# Patient Record
Sex: MALE | Race: WHITE
[De-identification: names, ages, dates, MRNs, and addresses within clinical notes are randomized per-mention and may not be internally consistent; named-entity substitution may affect disease eponyms.]

---

## 2017-06-10 ENCOUNTER — HOSPITAL ENCOUNTER (INPATIENT)
Dept: HOSPITAL 25 - ED | Age: 82
LOS: 11 days | Discharge: NURSING, REHAB OR LONG-TERM CARE | DRG: 378 | End: 2017-06-21
Attending: INTERNAL MEDICINE | Admitting: HOSPITALIST
Payer: MEDICARE

## 2017-06-10 DIAGNOSIS — I10: ICD-10-CM

## 2017-06-10 DIAGNOSIS — R09.02: ICD-10-CM

## 2017-06-10 DIAGNOSIS — Z80.8: ICD-10-CM

## 2017-06-10 DIAGNOSIS — R40.2412: ICD-10-CM

## 2017-06-10 DIAGNOSIS — Z87.891: ICD-10-CM

## 2017-06-10 DIAGNOSIS — Z80.42: ICD-10-CM

## 2017-06-10 DIAGNOSIS — M19.90: ICD-10-CM

## 2017-06-10 DIAGNOSIS — Z88.0: ICD-10-CM

## 2017-06-10 DIAGNOSIS — R55: ICD-10-CM

## 2017-06-10 DIAGNOSIS — Z79.01: ICD-10-CM

## 2017-06-10 DIAGNOSIS — E87.0: ICD-10-CM

## 2017-06-10 DIAGNOSIS — Z80.52: ICD-10-CM

## 2017-06-10 DIAGNOSIS — K92.1: ICD-10-CM

## 2017-06-10 DIAGNOSIS — K26.4: Primary | ICD-10-CM

## 2017-06-10 DIAGNOSIS — Z80.1: ICD-10-CM

## 2017-06-10 DIAGNOSIS — F10.231: ICD-10-CM

## 2017-06-10 DIAGNOSIS — T39.395A: ICD-10-CM

## 2017-06-10 DIAGNOSIS — R07.89: ICD-10-CM

## 2017-06-10 DIAGNOSIS — Z82.49: ICD-10-CM

## 2017-06-10 DIAGNOSIS — R50.9: ICD-10-CM

## 2017-06-10 DIAGNOSIS — J44.9: ICD-10-CM

## 2017-06-10 DIAGNOSIS — D62: ICD-10-CM
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
## 2017-06-10 DIAGNOSIS — R63.4: ICD-10-CM

## 2017-06-10 DIAGNOSIS — Z82.61: ICD-10-CM

## 2017-06-10 LAB
ALBUMIN SERPL BCG-MCNC: 2.9 G/DL (ref 3.2–5.2)
ALP SERPL-CCNC: 44 U/L (ref 34–104)
ALT SERPL W P-5'-P-CCNC: 9 U/L (ref 7–52)
ANION GAP SERPL CALC-SCNC: 5 MMOL/L (ref 2–11)
AST SERPL-CCNC: 12 U/L (ref 13–39)
BUN SERPL-MCNC: 61 MG/DL (ref 6–24)
BUN/CREAT SERPL: 47.3 (ref 8–20)
CALCIUM SERPL-MCNC: 8 MG/DL (ref 8.6–10.3)
CHLORIDE SERPL-SCNC: 108 MMOL/L (ref 101–111)
FERRITIN SERPL IA-MCNC: 24.6 NG/ML (ref 24–336)
FOLATE SERPL-MCNC: > 20 NG/ML (ref 3.99–?)
GLOBULIN SER CALC-MCNC: 2 G/DL (ref 2–4)
GLUCOSE SERPL-MCNC: 119 MG/DL (ref 70–100)
HCO3 SERPL-SCNC: 22 MMOL/L (ref 22–32)
HCT VFR BLD AUTO: 22 % (ref 42–52)
HGB BLD-MCNC: 7 G/DL (ref 14–18)
IRON SERPL-MCNC: 47 UG/DL (ref 50–212)
MATURATION FACTOR RETIC: 2
MCH RBC QN AUTO: 31 PG (ref 27–31)
MCHC RBC AUTO-ENTMCNC: 33 G/DL (ref 31–36)
MCV RBC AUTO: 95 FL (ref 80–94)
POTASSIUM SERPL-SCNC: 4.9 MMOL/L (ref 3.5–5)
PREALB SERPL-MCNC: 25 MG/DL (ref 18–38)
PROT SERPL-MCNC: 4.9 G/DL (ref 6.4–8.9)
RBC # BLD AUTO: 2.26 10^6/UL (ref 4–5.4)
RETICULOCYTE PRODUCTION INDEX: 0.9 % (ref 0.5–1.5)
SODIUM SERPL-SCNC: 135 MMOL/L (ref 133–145)
TIBC SERPL-MCNC: 279 MCG/DL (ref 250–450)
TRANSFERRIN SERPL-MCNC: 199 MG/DL (ref 203–362)
TROPONIN I SERPL-MCNC: 0 NG/ML (ref ?–0.04)
VIT B12 SERPL-MCNC: 214 PG/ML (ref 180–914)
WBC # BLD AUTO: 9.1 10^3/UL (ref 3.5–10.8)

## 2017-06-10 PROCEDURE — 83010 ASSAY OF HAPTOGLOBIN QUANT: CPT

## 2017-06-10 PROCEDURE — 85027 COMPLETE CBC AUTOMATED: CPT

## 2017-06-10 PROCEDURE — 83615 LACTATE (LD) (LDH) ENZYME: CPT

## 2017-06-10 PROCEDURE — 87077 CULTURE AEROBIC IDENTIFY: CPT

## 2017-06-10 PROCEDURE — 80048 BASIC METABOLIC PNL TOTAL CA: CPT

## 2017-06-10 PROCEDURE — 94640 AIRWAY INHALATION TREATMENT: CPT

## 2017-06-10 PROCEDURE — 93306 TTE W/DOPPLER COMPLETE: CPT

## 2017-06-10 PROCEDURE — 86900 BLOOD TYPING SEROLOGIC ABO: CPT

## 2017-06-10 PROCEDURE — 85018 HEMOGLOBIN: CPT

## 2017-06-10 PROCEDURE — 36415 COLL VENOUS BLD VENIPUNCTURE: CPT

## 2017-06-10 PROCEDURE — 87641 MR-STAPH DNA AMP PROBE: CPT

## 2017-06-10 PROCEDURE — C1751 CATH, INF, PER/CENT/MIDLINE: HCPCS

## 2017-06-10 PROCEDURE — 83550 IRON BINDING TEST: CPT

## 2017-06-10 PROCEDURE — 84484 ASSAY OF TROPONIN QUANT: CPT

## 2017-06-10 PROCEDURE — 85045 AUTOMATED RETICULOCYTE COUNT: CPT

## 2017-06-10 PROCEDURE — 83540 ASSAY OF IRON: CPT

## 2017-06-10 PROCEDURE — 82728 ASSAY OF FERRITIN: CPT

## 2017-06-10 PROCEDURE — 83735 ASSAY OF MAGNESIUM: CPT

## 2017-06-10 PROCEDURE — 87040 BLOOD CULTURE FOR BACTERIA: CPT

## 2017-06-10 PROCEDURE — 85610 PROTHROMBIN TIME: CPT

## 2017-06-10 PROCEDURE — 82607 VITAMIN B-12: CPT

## 2017-06-10 PROCEDURE — 93005 ELECTROCARDIOGRAM TRACING: CPT

## 2017-06-10 PROCEDURE — 85025 COMPLETE CBC W/AUTO DIFF WBC: CPT

## 2017-06-10 PROCEDURE — 74176 CT ABD & PELVIS W/O CONTRAST: CPT

## 2017-06-10 PROCEDURE — 70450 CT HEAD/BRAIN W/O DYE: CPT

## 2017-06-10 PROCEDURE — 85014 HEMATOCRIT: CPT

## 2017-06-10 PROCEDURE — 82270 OCCULT BLOOD FECES: CPT

## 2017-06-10 PROCEDURE — 80053 COMPREHEN METABOLIC PANEL: CPT

## 2017-06-10 PROCEDURE — 85730 THROMBOPLASTIN TIME PARTIAL: CPT

## 2017-06-10 PROCEDURE — P9040 RBC LEUKOREDUCED IRRADIATED: HCPCS

## 2017-06-10 PROCEDURE — 94660 CPAP INITIATION&MGMT: CPT

## 2017-06-10 PROCEDURE — 82140 ASSAY OF AMMONIA: CPT

## 2017-06-10 PROCEDURE — G0480 DRUG TEST DEF 1-7 CLASSES: HCPCS

## 2017-06-10 PROCEDURE — 86901 BLOOD TYPING SEROLOGIC RH(D): CPT

## 2017-06-10 PROCEDURE — 80320 DRUG SCREEN QUANTALCOHOLS: CPT

## 2017-06-10 PROCEDURE — 71010: CPT

## 2017-06-10 PROCEDURE — 82746 ASSAY OF FOLIC ACID SERUM: CPT

## 2017-06-10 PROCEDURE — 94760 N-INVAS EAR/PLS OXIMETRY 1: CPT

## 2017-06-10 PROCEDURE — 84134 ASSAY OF PREALBUMIN: CPT

## 2017-06-10 PROCEDURE — 86850 RBC ANTIBODY SCREEN: CPT

## 2017-06-10 PROCEDURE — 86922 COMPATIBILITY TEST ANTIGLOB: CPT

## 2017-06-10 RX ADMIN — PANTOPRAZOLE SODIUM SCH MLS/HR: 40 INJECTION, POWDER, FOR SOLUTION INTRAVENOUS at 22:54

## 2017-06-10 RX ADMIN — LORAZEPAM SCH: 1 TABLET ORAL at 23:30

## 2017-06-10 NOTE — HP
H&P (Free Text)


History and Physical: 





PCP: ELISABETH Rosenthal MD





Date/Time of Evaluation: 06/10/2017 2100





CC: dark stools, dizziness





HPI: Mr Payne is an 86YO male poor historian who reports 2-3 days (his wife 

interjects 'weeks') of black pasty stool which he states has no odor (his wife 

interjects that they are much more foul smelling than typical). He denies 

abdominal pain, but his wife states he has been complaining of a stomache ache 

daily for the past several days. He denies HX bleeding, heart problems, liver 

problems, renal problems, and history of cancer. He takes 2 diclofenac QAM and 

1 QHS along with an occasional OTC ibuprofen. He admits to drinking 2-4 beers/

day and 1-2 shots of vodka daily. He son-in-law (a retired highway patrolman) 

pulls me aside to inform that he is a "horrible boozer". Mr Payne denies chest 

pain, SOB, N/V, palpitations, and F/C. Today he became light-headed aggravated 

by standing and "nearly passed out" prompting this evaluation. Additionally his 

family is concerned about worsening L low back/hip pain and an unintentional ~40

# weight loss over the past year.





While in ED he was sent to radiology for an XRY of the L hip. When moving from 

the ED bed to the radiology table, he abruptly briefly syncopized to awaken 

reporting L shoulder pain and chest pressure for which a STAT ECG was negative 

for STEMI or ischemia.





~1week ago he was working on some land using a tractor near a drop off when the 

edge of the drop off gave way. He grabbed the tractor trying to prevent it from 

sliding over the edge and was pulled down by it sustaining abrasions and small 

skin tears to BLE, but denies head injury or LOC.





In 2012 he was admitted for pneumonia with a necrotic mass in the RLL and 

separate mass in the LLL associated with significant weight loss at that time, 

but neither he nor his wife can give details. 





PMedHx


COPD


HTN


RLL pulmonary abscess 2012


stable LLL lung mass


unintentional weight loss


alcoholism


OA





Medications


Nursing to reconcile.





Allergies


Penicillins [PCN] Allergy (Verified 06/17/14 19:23)


 Itching





PSurgHx


umbilical & ventral hernia repairs


ORIF R hip FX


B ankle ORIFs





SocHx: former smoker quit >20 years ago with ~50PYHX, active alcoholic 

admitting to 2-4beers/day and 1-2 shots of vodka, no recreational drugs; 

, lives with his wife; retired from [employer]; full code status





FamHx: Mother passed in her 80s 2nd lung disease. Burlington passed in his early 

90s 2nd complications of bladder & laryngeal CA. Otherwise positive for HTN, HLD

, prostate CA, & rheumatoid arthritis.





ROS: as above, otherwise reviewed and all were negative





Constitutional: NAD, normally developed, well-nourished elderly white male


 


vitals: 


 Vital Signs











Temp  37.0 C   06/10/17 20:15


 


Pulse  94   06/10/17 21:30


 


Resp  18   06/10/17 20:15


 


BP  139/94   06/10/17 21:30


 


Pulse Ox  95   06/10/17 21:30








 Intake & Output











 06/09/17 06/10/17 06/10/17





 23:59 11:59 23:59


 


Weight   63.503 kg








HEENM: atraumatic; sclera/conjunctiva: non-icteric/clear; hearing: clinically 

mild to moderately decreased; oropharynx: clear, mucosa tacky





Neck: soft tissue: non-tender; thyroid: normal





Pulmonary: clear to auscultation bilaterally, good aeration, no accessory 

muscle use 





CV: RR/RR, normal S1S2, no carotid bruit, no jugular venous distention, 2+ B DP/

PT, no edema





Abdominal: soft, non-distended, non-tender, no rebound/guarding/rigidity, 

normoactive bowel sounds, no hepatosplenomegaly or masses, no costovertebral 

angle tenderness





Musculoskeletal: general: grossly intact; gait: unstable





Integumental: healing small skin abrasions and small avulsion to BLE without 

warmth, erythema, pain, discharge, or malodor





Psychiatric 


orientation: AA&O to PPS


affect: calm


mood: cooperative


eye contact: good


content: unreliable


memory: impaired


responses: timely


insight: fair to poor





Testing: 


 Lab Results











  06/10/17 06/10/17 06/10/17 Range/Units





  20:30 20:30 20:30 


 


WBC   9.1   (3.5-10.8)  10^3/ul


 


RBC   2.26 L   (4.0-5.4)  10^6/ul


 


RBC (Retic)   2.26 L   (4.6-6.2)  10^6/ul


 


Hgb   7.0 L   (14.0-18.0)  g/dl


 


Hct   22 L   (42-52)  %


 


HCT (Retic)   22 L   (42-52)  %


 


MCV   95 H   (80-94)  fL


 


MCH   31   (27-31)  pg


 


MCHC   33   (31-36)  g/dl


 


RDW   15   (10.5-15)  %


 


Plt Count   218   (150-450)  10^3/ul


 


MPV   7 L   (7.4-10.4)  um3


 


Neut % (Auto)   79.3   (38-83)  %


 


Lymph % (Auto)   11.9 L   (25-47)  %


 


Mono % (Auto)   5.2   (1-9)  %


 


Eos % (Auto)   2.9   (0-6)  %


 


Baso % (Auto)   0.7   (0-2)  %


 


Absolute Neuts (auto)   7.2   (1.5-7.7)  10^3/ul


 


Absolute Lymphs (auto)   1.1   (1.0-4.8)  10^3/ul


 


Absolute Monos (auto)   0.5   (0-0.8)  10^3/ul


 


Absolute Eos (auto)   0.3   (0-0.6)  10^3/ul


 


Absolute Basos (auto)   0.1   (0-0.2)  10^3/ul


 


Absolute Nucleated RBC   0.01   10^3/ul


 


Nucleated RBC %   0.1   


 


Retic Count, Calc   1.9 H   (0.5-1.5)  %


 


Corrected Retic Count   0.9   (0.5-1.5)  %


 


Retic Shift Factor   2.0   


 


Retic Production Index   0.50   


 


Immature Retic Fraction   0.61   


 


Mean Retic Volume   131.3   


 


INR (Anticoag Therapy)  0.98    (0.89-1.11)  


 


APTT  45.6 H    (26.0-36.3)  seconds


 


Sodium    135  (133-145)  mmol/L


 


Potassium    4.9  (3.5-5.0)  mmol/L


 


Chloride    108  (101-111)  mmol/L


 


Carbon Dioxide    22  (22-32)  mmol/L


 


Anion Gap    5  (2-11)  mmol/L


 


BUN    61 H  (6-24)  mg/dL


 


Creatinine    1.29 H  (0.67-1.17)  mg/dL


 


Est GFR ( Amer)    67.8  (>60)  


 


Est GFR (Non-Af Amer)    52.7  (>60)  


 


BUN/Creatinine Ratio    47.3 H  (8-20)  


 


Glucose    119 H  ()  mg/dL


 


Calcium    8.0 L  (8.6-10.3)  mg/dL


 


Iron    47 L  ()  ug/dL


 


TIBC    279  (250-450)  mcg/dL


 


% Saturation    17  (15-55)  %


 


Unsat Iron Binding    232  ug/dL


 


Ferritin    Pending  


 


Total Bilirubin    0.30  (0.2-1.0)  mg/dL


 


AST    12 L  (13-39)  U/L


 


ALT    9  (7-52)  U/L


 


Alkaline Phosphatase    44  ()  U/L


 


Lactate Dehydrogenase    117 L  (140-271)  U/L


 


Troponin I    0.00  (<0.04)  ng/mL


 


Total Protein    4.9 L  (6.4-8.9)  g/dL


 


Albumin    2.9 L  (3.2-5.2)  g/dL


 


Globulin    2.0  (2-4)  g/dL


 


Albumin/Globulin Ratio    1.5  (1-3)  


 


Prealbumin    25  (18-38)  mg/dL


 


Vitamin B12    Pending  


 


Folate    Pending  


 


Serum Alcohol    < 10  (<10)  mg/dL


 


Blood Type     


 


Antibody Screen     


 


Crossmatch     














  06/10/17 Range/Units





  20:30 


 


WBC   (3.5-10.8)  10^3/ul


 


RBC   (4.0-5.4)  10^6/ul


 


RBC (Retic)   (4.6-6.2)  10^6/ul


 


Hgb   (14.0-18.0)  g/dl


 


Hct   (42-52)  %


 


HCT (Retic)   (42-52)  %


 


MCV   (80-94)  fL


 


MCH   (27-31)  pg


 


MCHC   (31-36)  g/dl


 


RDW   (10.5-15)  %


 


Plt Count   (150-450)  10^3/ul


 


MPV   (7.4-10.4)  um3


 


Neut % (Auto)   (38-83)  %


 


Lymph % (Auto)   (25-47)  %


 


Mono % (Auto)   (1-9)  %


 


Eos % (Auto)   (0-6)  %


 


Baso % (Auto)   (0-2)  %


 


Absolute Neuts (auto)   (1.5-7.7)  10^3/ul


 


Absolute Lymphs (auto)   (1.0-4.8)  10^3/ul


 


Absolute Monos (auto)   (0-0.8)  10^3/ul


 


Absolute Eos (auto)   (0-0.6)  10^3/ul


 


Absolute Basos (auto)   (0-0.2)  10^3/ul


 


Absolute Nucleated RBC   10^3/ul


 


Nucleated RBC %   


 


Retic Count, Calc   (0.5-1.5)  %


 


Corrected Retic Count   (0.5-1.5)  %


 


Retic Shift Factor   


 


Retic Production Index   


 


Immature Retic Fraction   


 


Mean Retic Volume   


 


INR (Anticoag Therapy)   (0.89-1.11)  


 


APTT   (26.0-36.3)  seconds


 


Sodium   (133-145)  mmol/L


 


Potassium   (3.5-5.0)  mmol/L


 


Chloride   (101-111)  mmol/L


 


Carbon Dioxide   (22-32)  mmol/L


 


Anion Gap   (2-11)  mmol/L


 


BUN   (6-24)  mg/dL


 


Creatinine   (0.67-1.17)  mg/dL


 


Est GFR ( Amer)   (>60)  


 


Est GFR (Non-Af Amer)   (>60)  


 


BUN/Creatinine Ratio   (8-20)  


 


Glucose   ()  mg/dL


 


Calcium   (8.6-10.3)  mg/dL


 


Iron   ()  ug/dL


 


TIBC   (250-450)  mcg/dL


 


% Saturation   (15-55)  %


 


Unsat Iron Binding   ug/dL


 


Ferritin   


 


Total Bilirubin   (0.2-1.0)  mg/dL


 


AST   (13-39)  U/L


 


ALT   (7-52)  U/L


 


Alkaline Phosphatase   ()  U/L


 


Lactate Dehydrogenase   (140-271)  U/L


 


Troponin I   (<0.04)  ng/mL


 


Total Protein   (6.4-8.9)  g/dL


 


Albumin   (3.2-5.2)  g/dL


 


Globulin   (2-4)  g/dL


 


Albumin/Globulin Ratio   (1-3)  


 


Prealbumin   (18-38)  mg/dL


 


Vitamin B12   


 


Folate   


 


Serum Alcohol   (<10)  mg/dL


 


Blood Type  A Positive  


 


Antibody Screen  Negative  


 


Crossmatch  See Detail  








initial ECG, personally reviewed: NSR rate 88, non-specific ST-T changes II/III/

AVF; repeat ECG after syncope: unchanged





Impression: 87M presenting with upper GI hemorrhage & anemia w/ orthostasis; 

syncope





DIAGNOSIS & PLAN


Primary 


upper GI hemorrhage


: IVFs


: pantoprazole bolus/GTT


: telemetry


: clear liquid diet, NPO after 0400 for EGD


: PIERCE Jeffrey MD GI consulted by me via phone, will evaluate in AM


: supplemental oxygen


: supportive care





syncope 2nd above


: as above


: check ECHO to r/o valvular disease





chest pressure


: telemetry


: trend troponin


: no aspirin or heparin given GI hemorrhage


: no beta blocker 2nd syncope and potential for hypovolemic shock


: consider more thorough cardiac work up once GI bleeding stabilized





unintentional weight loss w/ HX stable LLL mass and significant smoking HX


: check CXR, consider CT W pending result





Secondary 


alcoholism, active


: WAM protocol with seizure prophylaxis





suspect undiagnosed COPD


: albuterol nebs PRN


: mometasone/formoterol


: tiotropium


: incentive spirometry





HTN


: review medications once reconciled





Admission Rational: inpatient for GI bleeding requiring telemetry, IVFs, & 

close monitoring to prevent terminal decompensation


DVTp: SCDs, no anticoagulation 2nd GI bleeding


Code Status: full, needs follow up


HCP: wife

## 2017-06-11 LAB
ANION GAP SERPL CALC-SCNC: 4 MMOL/L (ref 2–11)
BUN SERPL-MCNC: 59 MG/DL (ref 6–24)
BUN/CREAT SERPL: 57.3 (ref 8–20)
CALCIUM SERPL-MCNC: 7.3 MG/DL (ref 8.6–10.3)
CHLORIDE SERPL-SCNC: 112 MMOL/L (ref 101–111)
GLUCOSE SERPL-MCNC: 99 MG/DL (ref 70–100)
HCO3 SERPL-SCNC: 20 MMOL/L (ref 22–32)
HCT VFR BLD AUTO: 23 % (ref 42–52)
HCT VFR BLD AUTO: 28 % (ref 42–52)
HGB BLD-MCNC: 7.7 G/DL (ref 14–18)
HGB BLD-MCNC: 9.3 G/DL (ref 14–18)
MCH RBC QN AUTO: 30 PG (ref 27–31)
MCH RBC QN AUTO: 31 PG (ref 27–31)
MCHC RBC AUTO-ENTMCNC: 33 G/DL (ref 31–36)
MCHC RBC AUTO-ENTMCNC: 33 G/DL (ref 31–36)
MCV RBC AUTO: 90 FL (ref 80–94)
MCV RBC AUTO: 91 FL (ref 80–94)
POTASSIUM SERPL-SCNC: 4.3 MMOL/L (ref 3.5–5)
RBC # BLD AUTO: 2.52 10^6/UL (ref 4–5.4)
RBC # BLD AUTO: 3.1 10^6/UL (ref 4–5.4)
SODIUM SERPL-SCNC: 136 MMOL/L (ref 133–145)
TROPONIN I SERPL-MCNC: 0 NG/ML (ref ?–0.04)
WBC # BLD AUTO: 8.3 10^3/UL (ref 3.5–10.8)
WBC # BLD AUTO: 9.7 10^3/UL (ref 3.5–10.8)

## 2017-06-11 PROCEDURE — 0DB68ZX EXCISION OF STOMACH, VIA NATURAL OR ARTIFICIAL OPENING ENDOSCOPIC, DIAGNOSTIC: ICD-10-PCS | Performed by: INTERNAL MEDICINE

## 2017-06-11 RX ADMIN — PANTOPRAZOLE SODIUM SCH MLS/HR: 40 INJECTION, POWDER, FOR SOLUTION INTRAVENOUS at 08:31

## 2017-06-11 RX ADMIN — MOMETASONE FUROATE AND FORMOTEROL FUMARATE DIHYDRATE SCH INH: 200; 5 AEROSOL RESPIRATORY (INHALATION) at 11:23

## 2017-06-11 RX ADMIN — LORAZEPAM SCH: 1 TABLET ORAL at 08:31

## 2017-06-11 RX ADMIN — Medication SCH MG: at 20:30

## 2017-06-11 RX ADMIN — LORAZEPAM SCH MG: 1 TABLET ORAL at 16:55

## 2017-06-11 RX ADMIN — THERA TABS SCH: TAB at 08:54

## 2017-06-11 RX ADMIN — FOLIC ACID SCH MG: 1 TABLET ORAL at 20:29

## 2017-06-11 RX ADMIN — MOMETASONE FUROATE AND FORMOTEROL FUMARATE DIHYDRATE SCH INH: 200; 5 AEROSOL RESPIRATORY (INHALATION) at 20:17

## 2017-06-11 RX ADMIN — SODIUM CHLORIDE SCH MLS/HR: 900 IRRIGANT IRRIGATION at 21:30

## 2017-06-11 RX ADMIN — PANTOPRAZOLE SODIUM SCH MLS/HR: 40 INJECTION, POWDER, FOR SOLUTION INTRAVENOUS at 17:53

## 2017-06-11 RX ADMIN — TIOTROPIUM BROMIDE SCH INH: 18 CAPSULE ORAL; RESPIRATORY (INHALATION) at 11:23

## 2017-06-11 RX ADMIN — SODIUM CHLORIDE SCH MLS/HR: 900 IRRIGANT IRRIGATION at 06:00

## 2017-06-11 RX ADMIN — SODIUM CHLORIDE SCH MLS/HR: 900 IRRIGANT IRRIGATION at 13:29

## 2017-06-11 RX ADMIN — LORAZEPAM SCH MG: 1 TABLET ORAL at 04:37

## 2017-06-11 NOTE — ED
Joselyn GARCIA Auryana, scribed for Fidel Hankins MD on 06/10/17 at 2124 .





GI/ HPI





- HPI Summary


HPI Summary: 


Patient is an 87-year-old male presenting to the ED with a CC of black stools 

for the past few weeks. Pt's wife and son-in-law are present. Pt has developed 

associated symptoms include fatigue, dizziness, SOB, and abdominal pain. Pt 

also notes mid-sternal chest pain intermittently starting yesterday. Pt denies 

any hemoptysis or hematemesis. Pt also reports left posterior hip pain for the 

past few weeks. He does note recent diffuse abrasions from an accident. Hx 

arthritis - takes Motrin. He denies taking any blood thinners. He denies Hx CA. 

Pt drinks daily per son-in-law - approximately a six pack and bottle of wine 

daily. FHx spinal tumor - daughter. Pt is followed by Dr. Rosenthal.





- History of Current Complaint


Chief Complaint: EDGIBleed


Time Seen by Provider: 06/10/17 20:17


Stated Complaint: NEAR SYNCOPE


Hx Obtained From: Patient, Family/Caretaker


Onset/Duration: Started Weeks Ago, Atraumatic, Still Present


Timing: Intermittent


Severity: Moderate


Pain Intensity: 2


Location of Pain: Diffuse


Associated Signs and Symptoms: Positive: Dizziness, Black Tarry Stool, 

Abdominal Pain, Chest Pain





- Allergy/Home Medications


Allergies/Adverse Reactions: 


 Allergies











Allergy/AdvReac Type Severity Reaction Status Date / Time


 


Penicillins [PCN] Allergy  Itching Verified 06/17/14 19:23














PMH/Surg Hx/FS Hx/Imm Hx


Respiratory History: Reports: Hx Chronic Obstructive Pulmonary Disease (COPD)





- Cancer History


Cancer Type, Location and Year: Denies Hx CA


Infectious Disease History: No


Infectious Disease History: 


   Denies: Traveled Outside the [place] in Last 30 Days





- Family History


Known Family History: Positive: Other - Spinal tumor





- Social History


Occupation: Retired


Alcohol Use: Daily





Review of Systems


Positive: Fatigue.  Negative: Fever, Chills


Negative: Erythema


Negative: Sore Throat


Positive: Chest Pain


Positive: Shortness Of Breath.  Negative: Cough


Gastrointestinal: Other - melena


Positive: Abdominal Pain.  Negative: Vomiting, Nausea


Negative: dysuria, hematuria


Positive: Arthralgia - L hip.  Negative: Myalgia, Edema


Skin: Other - abrasions


Negative: Rash


Neurological: Other - Dizziness


All Other Systems Reviewed And Are Negative: Yes





Physical Exam





- Summary


Physical Exam Summary: 





Constitutional: Well-developed, Well-nourished, Alert. (-) Distressed


Skin: Warm, Dry


HENT: Normocephalic; Atraumatic


Eyes: Conjunctiva normal


Neck: Musculoskeletal ROM normal neck. (-) JVD, (-) Stridor, (-) Tracheal 

deviation


Cardio: Rhythm regular, rate normal, Heart sounds normal; Intact distal pulses; 

The pedal pulses are 2+ and symmetric. Radial pulses are 2+ and symmetric. (-) 

Murmur


Pulmonary/Chest wall: Effort normal. (-) Respiratory distress, (-) Wheezes, (-) 

Rales


Abd: Soft, (-) Tenderness, (-) Distension, (-) Guarding, (-) Rebound.


Rectal exam: melena


Musculoskeletal: (-) Edema. Irregular bony prominence over the left posterior 

hip.


Lymph: (-) Cervical adenopathy


Neuro: Alert, Oriented x3


Psych: Mood and affect Normal





Triage Information Reviewed: Yes


Vital Signs On Initial Exam: 


 Initial Vitals











Temp


 


 98.6 F 


 


 06/10/17 20:13











Vital Signs Reviewed: Yes





- Bebeto Coma Scale


Coma Scale Total: 15





Diagnostics





- Vital Signs


 Vital Signs











  Temp Pulse Resp BP Pulse Ox


 


 06/10/17 20:46   90    97


 


 06/10/17 20:15  98.6 F  90  18  110/54  98


 


 06/10/17 20:13  98.6 F    














- Laboratory


Lab Results: 


 Lab Results











  06/10/17 06/10/17 Range/Units





  20:30 20:30 


 


WBC  9.1   (3.5-10.8)  10^3/ul


 


RBC  2.26 L   (4.0-5.4)  10^6/ul


 


Hgb  7.0 L   (14.0-18.0)  g/dl


 


Hct  22 L   (42-52)  %


 


MCV  95 H   (80-94)  fL


 


MCH  31   (27-31)  pg


 


MCHC  33   (31-36)  g/dl


 


RDW  15   (10.5-15)  %


 


Plt Count  218   (150-450)  10^3/ul


 


MPV  7 L   (7.4-10.4)  um3


 


Neut % (Auto)  79.3   (38-83)  %


 


Lymph % (Auto)  11.9 L   (25-47)  %


 


Mono % (Auto)  5.2   (1-9)  %


 


Eos % (Auto)  2.9   (0-6)  %


 


Baso % (Auto)  0.7   (0-2)  %


 


Absolute Neuts (auto)  7.2   (1.5-7.7)  10^3/ul


 


Absolute Lymphs (auto)  1.1   (1.0-4.8)  10^3/ul


 


Absolute Monos (auto)  0.5   (0-0.8)  10^3/ul


 


Absolute Eos (auto)  0.3   (0-0.6)  10^3/ul


 


Absolute Basos (auto)  0.1   (0-0.2)  10^3/ul


 


Absolute Nucleated RBC  0.01   10^3/ul


 


Nucleated RBC %  0.1   


 


Blood Type   A Positive  


 


Antibody Screen   Pending  











Result Diagrams: 


 06/10/17 20:30





 06/10/17 20:30


Lab Statement: Any lab studies that have been ordered have been reviewed, and 

results considered in the medical decision making process.





- EKG


  ** 20:22


Cardiac Rate: NL - at 88 bpm


EKG Rhythm: Sinus Rhythm


EKG Interpretation: Borderline T abnormalities, inferior leads. No STEMI





Re-Evaluation





- Re-Evaluation


  ** First Eval


Re-Evaluation Time: 22:10


Change: Worse


Comment: Patient was in XR being moved over when he had a syncopal episode. 

Afterwards, pt complained of left sided shoulder and chest pain which he 

thought was somewhat reproducible





GIGU Course/Dx





- Course


Assessment/Plan: An 88 y/o M presents to the ED with a CC of melena for the 

past few weeks, associated with fatigue and dizziness. At XR, pt has a syncopal 

episode. EKG shows no STEMI. Blood work shows hemoglobin of 7 and hematocrit of 

22. Stool occult blood is negative. Case discussed with Dr. Frankenberg who 

agrees to admit patient for further work up and management. The pt was brought 

upstairs prior to further work up of his shoulder and hip pain.





- Diagnoses


Provider Diagnoses: 


 Syncope, Symptomatic anemia, GI bleeding








- Physician Notifications


Discussed Care Of Patient With: Fred Frankenberg


Time Discussed With Above Provider: 21:19 - Agrees to admit. Made Dr. Jeffrey 

aware of patient.





Discharge





- Discharge Plan


Condition: Good


Disposition: ADMITTED TO [facility] documentation as recorded by the scribJoselyn reich Auryana accurately 

reflects the service I personally performed and the decisions made by me, Fidel Hankins MD.

## 2017-06-11 NOTE — ECHO
Patient:      BOB LYNN

[facility] Rec#:     [id number]            :          1929          

Date:         2017            Age:          87y                 

Account#:     [id number]          Height:       172.72 cm / 68.0 in

Accession#:   [id number]           Weight:       63.5 kg / 140.0 lbs

Sex:          M                     BSA:          1.76

Room#:        ICU-2                 

Admit Date#:  06/10/2017          

Type:         Inpatient

 

Referring:    Fred Frankenberg,MD

Reading:      Anjum Perez DO

Sonographer:  Aleksandra Kincaid AMANDA

CC:           Philip Rosenthal MD

______________________________________________________________________

 

Transthoracic Echocardiogram

 

Indication:

Syncope

BP:           125/58

HR:           88

Rhythm:       NSR with PACs

 

Findings     

History:

GI bleed,COPD,lung masses, former smoker,ETOH. 

 

Technical Comments:

The study is technically limited due to the patient's history of COPD.  

Completed at 0833. 

 

Left Ventricle:

The left ventricular chamber size is normal. Mild concentric left

ventricular hypertrophy is observed. Global left ventricular wall motion

and contractility are within normal limits. There is normal left

ventricular systolic function. The estimated ejection fraction is

60-65%.  The assessment of diastolic function is non-diagnostic. 

 

Left Atrium:

The left atrium is slightly dilated.  

 

Right Ventricle:

The right ventricular chamber size and systolic function are within

normal limits. 

 

Right Atrium:

The right atrial cavity size is normal. 

 

Aortic Valve:

The aortic valve is trileaflet. There is evidence of aortic sclerosis

without stenosis. There is no evidence of aortic regurgitation. There is

no evidence of aortic stenosis. 

 

Mitral Valve:

The mitral valve leaflets are mildly thickened. There is trace to mild

mitral regurgitation. There is no evidence of mitral stenosis. 

 

Tricuspid Valve:

The tricuspid valve leaflets are normal.  There is mild tricuspid

regurgitation. There is evidence of mild pulmonary hypertension. There

is no tricuspid stenosis. 

 

Pulmonic Valve:

The pulmonic valve structure is not well visualized. There is no

evidence of pulmonic regurgitation. There is no pulmonic stenosis.  

 

Pericardium:

There is no significant pericardial effusion. 

 

Aorta:

There is mild dilatation of the ascending aorta. The aortic arch is not

well visualized.  There is mild dilatation of the aortic root. 

 

Pulmonary Artery:

The main pulmonary artery is not well visualized. 

 

Venous:

The venous system is not well visualized. 

 

Conclusions

The left ventricular chamber size is normal.

Mild concentric left ventricular hypertrophy is observed.

Global left ventricular wall motion and contractility are within normal

limits.

The estimated LV ejection fraction is 60-65%. 

The left atrium is slightly dilated. 

The right ventricular chamber size and systolic function are within

normal limits.

There is evidence of aortic sclerosis without stenosis.

There is no more than mild valvular regurgitation noted

There is evidence of mild pulmonary hypertension.

There is mild dilatation of the ascending aorta and aortic root.

 

No prior studies available for comparison at time of interpretation

 

Measurements     

Name                    Value         Normal Range            

RVIDd (AP) 2D           2.3 cm        (0.9 - 2.6)             

RA (A4C)W               2.8 cm        (2.9 - 4.6)             

IVSd (2D)               1.2 cm        (0.6 - 1)               

LVPWd (2D)              1.1 cm        (0.6 - 1)               

LVIDd (2D)              2.6 cm        (3.6 - 5.4)             

LVIDs (2D)              2 cm          -                        

LV FS (2D)              23 %          (25 - 45)               

Aortic Annulus          2.2 cm        (1.4 - 2.6)             

Ao root diameter (2D)   4 cm          (2.1 - 3.5)             

Ascending Ao            4.2 cm        (2.1 - 3.4)             

LA dimension (AP) 2D    4.6 cm        (2.3 - 3.8)             

LAd ISD 4CH             4.8 cm        (2.9 - 5.3)             

LA ISD 4CH W            4.4 cm        (2.5 - 4.5)             

 

Name                    Value         Normal Range            

LA ESV SP 4CH (A/L)     55 ml         -                        

LA ESV SP 2CH (A/L)     46 ml         -                        

LA ESV BP (A/L)         55 ml         -                        

LA ESV BP (A/L) index   31.16 ml/m2   -                        

LA ESV SP 4CH (MOD)     49 ml         -                        

LA ESV SP 2CH (MOD)     40 ml         -                        

 

Name                    Value         Normal Range            

MV E-wave Vmax          1.1 m/sec     -                        

MV deceleration time    161 msec      -                        

LV septal e' Vmax       0.06 m/sec    -                        

LV lateral e' Vmax      0.17 m/sec    -                        

LV E:e' septal ratio    18.33 ratio   -                        

LV E:e' lateral ratio   6.47 ratio    -                        

 

Name                    Value         Normal Range            

AV Vmax                 1.5 m/sec     -                        

AV VTI                  30 cm         -                        

AV peak gradient        9.56 mmHg     -                        

AV mean gradient        5.03 mmHg     -                        

LVOT diameter           2.3 cm        -                        

LVOT Vmax               1.1 m/sec     -                        

LVOT VTI                22.1 cm       -                        

LVOT peak gradient      4.63 mmHg     -                        

LVOT mean gradient      1.87 mmHg     -                        

ERINN (continuity Vmax)   3.3 cm2       -                        

ERINN (continuity VTI)    3 cm2         -                        

 

Name                    Value         Normal Range            

TR Vmax                 2.7 m/sec     -                        

TR peak gradient        30 mmHg       -                        

RAP                     8 mmHg        -                        

RVSP                    38 mmHg       -                        

 

Name                    Value         Normal Range            

PV Vmax                 1 m/sec       -                        

PV peak gradient        4 mmHg        -                        

 

Electronically signed by: Anjum Perez DO on 2017 12:51:06
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
## 2017-06-11 NOTE — RAD
Indication: Weight loss. Upper GI hemorrhage. Anemia.



Comparison: August 13, 2000 1216.



Technique: Upright AP 0900 hours



Report: Elevated lung volumes and both diffuse mild prominence of the interstitial

markings and patchy rarefaction of the mid to upper lung zone interstitial markings. Mild

chronic linear subsegmental atelectasis or pleural-parenchymal scarring at the bilateral

mid to lower lung zones. Probable small RIGHT pleural effusion. Negative for cardiomegaly.

Unremarkable central pulmonary vasculature and mediastinal contours. Negative for free air

beneath the diaphragm.



IMPRESSION: Stigmata of chronic obstructive pulmonary disease and emphysema. No acute

cardiopulmonary process evident.

## 2017-06-11 NOTE — PN
Subjective


Date of Service: 06/11/17


Interval History: 


.


saw patient this AM and watched EGD with Dr. Jeffrey.


No active bleeding, though culprit vessels observed c/w UGIB.


patient stable. Getting 3rd/4th units PRBC -- f/u CBC pending.


hemodynamically stable.


denies pain at this time.


IV PPI ongoing.


.





Family History: Unchanged from Admission


Social History: Unchanged from Admission


Past Medical History: Unchanged from Admission





Objective


Active Medications: 


.


Acetaminophen (Tylenol Tab*)  650 mg PO Q6H PRN


   PRN Reason: FEVER/PAIN


Albuterol (Ventolin 2.5 Mg/3 Ml Neb.Sol*)  2.5 mg INH Q2H PRN


   PRN Reason: SOB/WHEEZING


Folic Acid (Folvite Tab*)  1 mg PO DAILY [facility]


   Last Admin: 06/11/17 08:54 Dose:  Not Given


Pantoprazole Sodium 80 mg/ (Sodium Chloride)  250 mls @ 25 mls/hr IVPB Q10H [facility]


   Last Admin: 06/11/17 08:31 Dose:  25 mls/hr


Sodium Chloride (Ns 0.9% 1000 Ml*)  1,000 mls @ 125 mls/hr IV PER RATE [facility]


   Last Admin: 06/11/17 13:29 Dose:  125 mls/hr


Lorazepam (Ativan Inj*)  0 mg IV .PER WAM SCORE [facility]


   PRN Reason: Protocol


Lorazepam (Ativan Tab(*))  2 mg PO Q12H [facility]


   PRN Reason: Taper


   Stop: 06/13/17 18:59


   Last Admin: 06/11/17 08:31 Dose:  Not Given


Melatonin (Melatonin (Nf))  3 mg PO BEDTIME PRN; Protocol


   PRN Reason: Sleep


Mometasone Furoate/Formoterol Fumar (Dulera 200/5 Mdi*)  2 puff INH BID [facility]


   Last Admin: 06/11/17 11:23 Dose:  2 inh


Morphine Sulfate (Morphine Inj (Syringe)*)  1 mg IV Q2H PRN


   PRN Reason: PAIN


Multivitamins/Minerals (Theragran/Minerals Tab*)  1 tab PO DAILY [facility]


   Last Admin: 06/11/17 08:54 Dose:  Not Given


Ondansetron HCl (Zofran Inj*)  4 mg IV Q6H PRN


   PRN Reason: NAUSEA


Pantoprazole Sodium (Protonix Iv*)  80 mg IV ED ONCE ONE


   Stop: 06/11/17 21:56


   Last Admin: 06/10/17 23:10 Dose:  80 mg


Prochlorperazine Edisylate (Compazine Inj*)  10 mg IV Q6H PRN


   PRN Reason: NAUSEA


Thiamine HCl (Vitamin B-1 Tab*)  100 mg PO DAILY [facility]


   Last Admin: 06/11/17 08:54 Dose:  Not Given


Tiotropium Bromide (Spiriva Cap.Inh*)  1 cap INH DAILY [facility]


   Last Admin: 06/11/17 11:23 Dose:  1 inh


.


 Vital Signs











  06/10/17 06/10/17 06/10/17





  21:26 21:30 21:42


 


Temperature   


 


Pulse Rate 93 94 97


 


Respiratory   





Rate   


 


Blood Pressure 94/74 139/94 





(mmHg)   


 


O2 Sat by Pulse 97 95 96





Oximetry   














  06/10/17 06/10/17 06/10/17





  21:46 21:59 22:12


 


Temperature 98.2 F  


 


Pulse Rate 90 94 


 


Respiratory 14  





Rate   


 


Blood Pressure 91/46  91/54





(mmHg)   


 


O2 Sat by Pulse 100 100 





Oximetry   








Appearance: NAD at this time.


Eyes: No Scleral Icterus, PERRLA


Ears/Nose/Mouth/Throat: NL Teeth, Lips, Gums


Respiratory: Symmetrical Chest Expansion and Respiratory Effort


Cardiovascular: NL Sounds; No Murmurs; No JVD


Abdominal: NL Sounds; No Tenderness; No Distention


Extremities: No Edema


Skin: No Rash or Ulcers


Neurological: NL Sensation


Lines/Tubes/Other Access: Clean, Dry and Intact Peripheral IV


Nutrition: - - NPO (EGD)


Result Diagrams: 


 06/11/17 06:29





 06/11/17 06:29


Microbiology and Other Data: 


 Microbiology











 06/10/17 22:45 Nasal Screen MRSA (PCR)(FRANCISCO) - Final





 Nasal    Mrsa Negative














Assess/Plan/Problems-Billing


.


Assessment: 


86 yo man with upper GI bleed in setting of significan ETOH and NSAID use.





PMH notable for:


- COPD


- HTN


- RLL pulmonary abscess in 2012 (? aspiration)


- Stable LLL lung mass


- Unintentional weight loss last year


- Alcoholism


- Osteoarthritis





- Patient Problems


(1) Anemia due to acute blood loss


Current Visit: Yes   Status: Acute   Priority: High   Code(s): D62 - ACUTE 

POSTHEMORRHAGIC ANEMIA   Comment: 


- s/p 4 units prbc


- following h/h


- keep 2-3 PIV's


- EGD results noted; appreciate GI consult.   





(2) Upper GI bleed


Current Visit: Yes   Status: Acute   Code(s): K92.2 - GASTROINTESTINAL 

HEMORRHAGE, UNSPECIFIED   SNOMED Code(s): 23957257


   Comment: 


- IV PPI gtt ongoing.


- combination of NSAIDS and etoh use.   





(3) Alcoholism /alcohol abuse


Current Visit: Yes   Status: Acute   Code(s): F10.20 - ALCOHOL DEPENDENCE, 

UNCOMPLICATED   SNOMED Code(s): 8106445


   Comment: 


- SW consult ordered   





(4) COPD (chronic obstructive pulmonary disease)


Current Visit: Yes   Status: Acute   Code(s): J44.9 - CHRONIC OBSTRUCTIVE 

PULMONARY DISEASE, UNSPECIFIED   SNOMED Code(s): 91071266

## 2017-06-12 LAB
ANION GAP SERPL CALC-SCNC: 1 MMOL/L (ref 2–11)
BUN SERPL-MCNC: 28 MG/DL (ref 6–24)
BUN/CREAT SERPL: 31.5 (ref 8–20)
CALCIUM SERPL-MCNC: 7.7 MG/DL (ref 8.6–10.3)
CHLORIDE SERPL-SCNC: 114 MMOL/L (ref 101–111)
GLUCOSE SERPL-MCNC: 95 MG/DL (ref 70–100)
HCO3 SERPL-SCNC: 19 MMOL/L (ref 22–32)
HCT VFR BLD AUTO: 26 % (ref 42–52)
HGB BLD-MCNC: 8.9 G/DL (ref 14–18)
MCH RBC QN AUTO: 31 PG (ref 27–31)
MCHC RBC AUTO-ENTMCNC: 34 G/DL (ref 31–36)
MCV RBC AUTO: 89 FL (ref 80–94)
POTASSIUM SERPL-SCNC: 3.8 MMOL/L (ref 3.5–5)
RBC # BLD AUTO: 2.91 10^6/UL (ref 4–5.4)
SODIUM SERPL-SCNC: 134 MMOL/L (ref 133–145)
WBC # BLD AUTO: 7.5 10^3/UL (ref 3.5–10.8)

## 2017-06-12 RX ADMIN — MOMETASONE FUROATE AND FORMOTEROL FUMARATE DIHYDRATE SCH INH: 200; 5 AEROSOL RESPIRATORY (INHALATION) at 08:01

## 2017-06-12 RX ADMIN — LORAZEPAM SCH MG: 2 INJECTION INTRAMUSCULAR; INTRAVENOUS at 19:43

## 2017-06-12 RX ADMIN — SODIUM CHLORIDE SCH MLS/HR: 900 IRRIGANT IRRIGATION at 13:13

## 2017-06-12 RX ADMIN — ACETAMINOPHEN PRN MG: 325 TABLET ORAL at 08:09

## 2017-06-12 RX ADMIN — Medication SCH MG: at 08:09

## 2017-06-12 RX ADMIN — SODIUM CHLORIDE SCH MLS/HR: 900 IRRIGANT IRRIGATION at 04:37

## 2017-06-12 RX ADMIN — LORAZEPAM SCH MG: 2 INJECTION INTRAMUSCULAR; INTRAVENOUS at 22:07

## 2017-06-12 RX ADMIN — SODIUM CHLORIDE SCH MLS/HR: 900 IRRIGANT IRRIGATION at 20:13

## 2017-06-12 RX ADMIN — LORAZEPAM SCH MG: 2 INJECTION INTRAMUSCULAR; INTRAVENOUS at 08:10

## 2017-06-12 RX ADMIN — LORAZEPAM SCH MG: 1 TABLET ORAL at 15:45

## 2017-06-12 RX ADMIN — LORAZEPAM SCH MG: 1 TABLET ORAL at 02:50

## 2017-06-12 RX ADMIN — FOLIC ACID SCH MG: 1 TABLET ORAL at 08:10

## 2017-06-12 RX ADMIN — TIOTROPIUM BROMIDE SCH INH: 18 CAPSULE ORAL; RESPIRATORY (INHALATION) at 08:01

## 2017-06-12 RX ADMIN — PANTOPRAZOLE SODIUM SCH MLS/HR: 40 INJECTION, POWDER, FOR SOLUTION INTRAVENOUS at 02:52

## 2017-06-12 RX ADMIN — THERA TABS SCH TAB: TAB at 08:09

## 2017-06-12 RX ADMIN — PANTOPRAZOLE SODIUM SCH: 40 INJECTION, POWDER, FOR SOLUTION INTRAVENOUS at 15:30

## 2017-06-12 RX ADMIN — MOMETASONE FUROATE AND FORMOTEROL FUMARATE DIHYDRATE SCH INH: 200; 5 AEROSOL RESPIRATORY (INHALATION) at 20:12

## 2017-06-12 NOTE — PRO
DATE:  06/11/17 - ROOM #ICU-02   REFERRING PHYSICIAN:  Eliot Vance; Philip Rosenthal *

 

PROCEDURE:  Upper gastrointestinal endoscopy and gastric biopsy for CLOtest.

 

INDICATION:  Upper gastrointestinal bleeding with melena.  He has been on 
diclofenac regularly and sporadic p.r.n. ibuprofen for many years.

 

ENDOSCOPIST:  Dr. Jeffrey                            MEDICATIONS:  Midazolam 4, 
meperidine 25. 



FINDINGS:  He is an older man, quite vague.  Consent was obtained from him and 
his wife together.



 EGD: Larynx - narrow, limited views.

Esophagus - difficult entry as initiation of swallow appeared delayed.  No 
diverticulum was seen, but getting through the cricopharyngeal area was more 
difficult than average.  The mucosa then in the upper, mid and lower esophagus 
was normal.  The EJ junction was at 39 and normal.  There were no erosions.

 

Stomach - generally normal mucosa in the cardia, fundus and body.  No erosions 
and no ulcer was seen.  There was no blood.  The antrum appeared normal.  The 
pylorus was wide open and did not show any function.

 

Duodenum - the pylorus is wide open and intact without any ulceration.  The 
bulb appeared initially normal, but there was considerable bubbling; and, when 
that was dispersed an ulcer was revealed at the apex of the bulb.  It was 
substantial moderately deep 14 to 15 mm triangular ulcer with a small red spot 
at its base.  It was relatively flat and there was no bleeding and no adherent 
clot.  On the opposite wall, there was a 6 to 7 mm ulceration and couple of 
centimeters further down at the apex of the bulb at noon a liner erosion.  The 
third and fourth portions of the duodenum were normal.  Again, there was no 
bleeding.  Coming back, a CLOtest was taken of the gastric body.

 

IMPRESSION:

1.  Duodenal ulcer - several, not currently bleeding and he is on a PPI drip.  
Plan will be to continue oral PPI as an outpatient and no NSAIDs, consideration 
of repeat endoscopy in 2 months at which time a sporadic NSAID might be 
considered if that would proved to be clinically useful and could be monitored 
by others 

2.  Weight loss - no cause seen on this exam and as the wife traces it back to 
the time when Dr. Iyer demonstrated resolution of a lung infection via CT 
scan may be 4 to 5 years in duration.

                       Addendum: Clotest positive - to be treated when stable 
enough to cooperate

 

[id number]/CPS #: [id number]

RENY

## 2017-06-12 NOTE — PN
Subjective


Date of Service: 06/12/17


Interval History: 


.


Wife at bedside.


Pt scoring for WAM.


Transfer to medical floor.


.


Family History: Unchanged from Admission


Social History: Unchanged from Admission


Past Medical History: Unchanged from Admission





Objective


Active Medications: 


.


Acetaminophen (Tylenol Tab*)  650 mg PO Q6H PRN


   PRN Reason: FEVER/PAIN


   Last Admin: 06/12/17 08:09 Dose:  650 mg


Albuterol (Ventolin 2.5 Mg/3 Ml Neb.Sol*)  2.5 mg INH Q2H PRN


   PRN Reason: SOB/WHEEZING


Folic Acid (Folvite Tab*)  1 mg PO DAILY [facility]


   Last Admin: 06/12/17 08:10 Dose:  1 mg


Sodium Chloride (Ns 0.9% 1000 Ml*)  1,000 mls @ 125 mls/hr IV PER RATE [facility]


   Last Admin: 06/12/17 13:13 Dose:  125 mls/hr


Lorazepam (Ativan Inj*)  0 mg IV .PER WAM SCORE [facility]


   PRN Reason: Protocol


   Last Admin: 06/12/17 08:10 Dose:  2 mg


Lorazepam (Ativan Tab(*))  2 mg PO Q12H [facility]


   PRN Reason: Taper


   Stop: 06/13/17 18:59


   Last Admin: 06/12/17 15:45 Dose:  2 mg


Melatonin (Melatonin (Nf))  3 mg PO BEDTIME PRN; Protocol


   PRN Reason: Sleep


Mometasone Furoate/Formoterol Fumar (Dulera 200/5 Mdi*)  2 puff INH BID [facility]


   Last Admin: 06/12/17 08:01 Dose:  2 inh


Morphine Sulfate (Morphine Inj (Syringe)*)  1 mg IV Q2H PRN


   PRN Reason: PAIN


Multivitamins/Minerals (Theragran/Minerals Tab*)  1 tab PO DAILY [facility]


   Last Admin: 06/12/17 08:09 Dose:  1 tab


Ondansetron HCl (Zofran Inj*)  4 mg IV Q6H PRN


   PRN Reason: NAUSEA


Prochlorperazine Edisylate (Compazine Inj*)  10 mg IV Q6H PRN


   PRN Reason: NAUSEA


Thiamine HCl (Vitamin B-1 Tab*)  100 mg PO DAILY [facility]


   Last Admin: 06/12/17 08:09 Dose:  100 mg


Tiotropium Bromide (Spiriva Cap.Inh*)  1 cap INH DAILY [facility]


   Last Admin: 06/12/17 08:01 Dose:  1 inh








 Vital Signs











  06/11/17 06/11/17 06/11/17





  20:00 20:22 21:00


 


Temperature 99.3 F  


 


Pulse Rate 80 81 82


 


Respiratory 16 15 15





Rate   


 


Blood Pressure 132/59  145/65





(mmHg)   


 


O2 Sat by Pulse 99 100 99





Oximetry   














  06/11/17 06/11/17 06/11/17





  22:00 23:00 23:36


 


Temperature   


 


Pulse Rate 83 80 82


 


Respiratory 15 15 16





Rate   


 


Blood Pressure 116/84 134/57 





(mmHg)   


 


O2 Sat by Pulse 99 99 95





Oximetry   











Oxygen Devices in Use Now: Nasal Cannula


Appearance: confused / WAM'ing.


Ears/Nose/Mouth/Throat: Clear Oropharnyx


Neck: Trachea Midline


Respiratory: Symmetrical Chest Expansion and Respiratory Effort


Cardiovascular: NL Sounds; No Murmurs; No JVD


Abdominal: NL Sounds; No Tenderness; No Distention


Extremities: No Edema


Skin: No Rash or Ulcers


Neurological: - - A&Ox1 (self)


Lines/Tubes/Other Access: Clean, Dry and Intact Peripheral IV


Nutrition: Taking PO's


Result Diagrams: 


 06/12/17 05:50





 06/12/17 05:50


Additional Lab and Data: 


.


Microbiology and Other Data: 


 Microbiology











 06/10/17 22:45 Nasal Screen MRSA (PCR)(FRANCISCO) - Final





 Nasal    Mrsa Negative














Assess/Plan/Problems-Billing


.


Assessment: 


88 yo man with upper GI bleed in setting of significant ETOH and NSAID use.





PMH notable for:


- COPD


- HTN


- RLL pulmonary abscess in 2012 (? aspiration)


- Stable LLL lung mass


- Unintentional weight loss last year


- Alcoholism


- Osteoarthritis





- Patient Problems


(1) Anemia due to acute blood loss


Current Visit: Yes   Status: Acute   Priority: High   Code(s): D62 - ACUTE 

POSTHEMORRHAGIC ANEMIA   Comment: 


- s/p 4 units prbc


- following h/h


- keep 2-3 PIV's


- EGD results noted; appreciate GI consult.   





(2) Upper GI bleed


Current Visit: Yes   Status: Acute   Code(s): K92.2 - GASTROINTESTINAL 

HEMORRHAGE, UNSPECIFIED   SNOMED Code(s): 27648014


   Comment: 


- IV PPI gtt ongoing.


- combination of NSAIDS and etoh use.   





(3) Alcoholism /alcohol abuse


Current Visit: Yes   Status: Acute   Code(s): F10.20 - ALCOHOL DEPENDENCE, 

UNCOMPLICATED   SNOMED Code(s): 6699262


   Comment: 


- SW consult ordered   





(4) COPD (chronic obstructive pulmonary disease)


Current Visit: Yes   Status: Acute   Code(s): J44.9 - CHRONIC OBSTRUCTIVE 

PULMONARY DISEASE, UNSPECIFIED   SNOMED Code(s): 22744070


   





(5) Alcohol withdrawal delirium


Current Visit: Yes   Status: Acute   Priority: High   Code(s): F10.231 - 

ALCOHOL DEPENDENCE WITH WITHDRAWAL DELIRIUM   Comment: 


- On [facility] protocol

## 2017-06-13 LAB
ADD DIFF/SLIDE REVIEW?: (no result)
ANION GAP SERPL CALC-SCNC: 4 MMOL/L (ref 2–11)
BUN SERPL-MCNC: 40 MG/DL (ref 6–24)
BUN/CREAT SERPL: 40.4 (ref 8–20)
CALCIUM SERPL-MCNC: 7.5 MG/DL (ref 8.6–10.3)
CHLORIDE SERPL-SCNC: 119 MMOL/L (ref 101–111)
GLUCOSE SERPL-MCNC: 106 MG/DL (ref 70–100)
HCO3 SERPL-SCNC: 18 MMOL/L (ref 22–32)
HCT VFR BLD AUTO: 14 % (ref 42–52)
HCT VFR BLD AUTO: 14 % (ref 42–52)
HGB BLD-MCNC: 4.6 G/DL (ref 14–18)
HGB BLD-MCNC: 4.7 G/DL (ref 14–18)
HYPOCHROMIA BLD QL: (no result)
MCH RBC QN AUTO: 31 PG (ref 27–31)
MCH RBC QN AUTO: 31 PG (ref 27–31)
MCHC RBC AUTO-ENTMCNC: 33 G/DL (ref 31–36)
MCHC RBC AUTO-ENTMCNC: 33 G/DL (ref 31–36)
MCV RBC AUTO: 94 FL (ref 80–94)
MCV RBC AUTO: 94 FL (ref 80–94)
POLYCHROMASIA BLD QL SMEAR: (no result)
POTASSIUM SERPL-SCNC: 3.6 MMOL/L (ref 3.5–5)
RBC # BLD AUTO: 1.48 10^6/UL (ref 4–5.4)
RBC # BLD AUTO: 1.49 10^6/UL (ref 4–5.4)
SODIUM SERPL-SCNC: 141 MMOL/L (ref 133–145)
TOXIC GRANULES BLD QL SMEAR: (no result)
WBC # BLD AUTO: 5.6 10^3/UL (ref 3.5–10.8)
WBC # BLD AUTO: 6.7 10^3/UL (ref 3.5–10.8)

## 2017-06-13 RX ADMIN — LORAZEPAM SCH MG: 2 INJECTION INTRAMUSCULAR; INTRAVENOUS at 20:40

## 2017-06-13 RX ADMIN — THERA TABS SCH TAB: TAB at 11:56

## 2017-06-13 RX ADMIN — SODIUM CHLORIDE SCH MLS/HR: 900 IRRIGANT IRRIGATION at 20:04

## 2017-06-13 RX ADMIN — LORAZEPAM SCH MG: 2 INJECTION INTRAMUSCULAR; INTRAVENOUS at 00:44

## 2017-06-13 RX ADMIN — FOLIC ACID SCH MG: 1 TABLET ORAL at 11:56

## 2017-06-13 RX ADMIN — LORAZEPAM SCH MG: 2 INJECTION INTRAMUSCULAR; INTRAVENOUS at 02:55

## 2017-06-13 RX ADMIN — ACETAMINOPHEN PRN MG: 325 TABLET ORAL at 17:44

## 2017-06-13 RX ADMIN — LORAZEPAM SCH MG: 2 INJECTION INTRAMUSCULAR; INTRAVENOUS at 04:52

## 2017-06-13 RX ADMIN — TIOTROPIUM BROMIDE SCH INH: 18 CAPSULE ORAL; RESPIRATORY (INHALATION) at 07:51

## 2017-06-13 RX ADMIN — SODIUM CHLORIDE SCH MLS/HR: 900 IRRIGANT IRRIGATION at 04:05

## 2017-06-13 RX ADMIN — MOMETASONE FUROATE AND FORMOTEROL FUMARATE DIHYDRATE SCH PUFF: 200; 5 AEROSOL RESPIRATORY (INHALATION) at 07:51

## 2017-06-13 RX ADMIN — SODIUM CHLORIDE SCH MLS/HR: 900 IRRIGANT IRRIGATION at 11:46

## 2017-06-13 RX ADMIN — Medication SCH MG: at 11:56

## 2017-06-13 RX ADMIN — LORAZEPAM SCH: 1 TABLET ORAL at 03:00

## 2017-06-13 RX ADMIN — MOMETASONE FUROATE AND FORMOTEROL FUMARATE DIHYDRATE SCH: 200; 5 AEROSOL RESPIRATORY (INHALATION) at 21:44

## 2017-06-13 NOTE — RAD
HISTORY: New hypoxia



COMPARISONS: June 11, 2017



VIEWS:1: Single frontal portable view of the chest at 4:54 PM



FINDINGS:

LINES AND TUBES: None.

CARDIOMEDIASTINAL SILHOUETTE: The cardiomediastinal silhouette is normal for portable

technique.

PLEURA: The costophrenic angles are sharp. No pleural abnormalities are noted.

LUNG PARENCHYMA: There is hyperinflation.

ABDOMEN: The upper abdomen is clear. There is no subphrenic gas.

BONES AND SOFT TISSUES: There is evidence of remote trauma to the left clavicle



IMPRESSION: HYPERINFLATION CONSISTENT WITH COPD. NO ACTIVE CARDIOPULMONARY DISEASE

## 2017-06-13 NOTE — PN
Subjective


Date of Service: 06/13/17


Interval History: 





Increased delerium


Seen again later in the day when developed hypoxia. Placed on CPAP with relief


Still requiring ativan for withdrawal





Family History: Unchanged from Admission


Social History: Unchanged from Admission


Past Medical History: Unchanged from Admission





Objective


Active Medications: 








Acetaminophen (Tylenol Tab*)  650 mg PO Q6H PRN


   PRN Reason: FEVER/PAIN


   Last Admin: 06/12/17 08:09 Dose:  650 mg


Albuterol (Ventolin 2.5 Mg/3 Ml Neb.Sol*)  2.5 mg INH Q2H PRN


   PRN Reason: SOB/WHEEZING


Folic Acid (Folvite Tab*)  1 mg PO DAILY [facility]


   Last Admin: 06/13/17 11:56 Dose:  1 mg


Sodium Chloride (Ns 0.9% 1000 Ml*)  1,000 mls @ 125 mls/hr IV PER RATE [facility]


   Last Admin: 06/13/17 11:46 Dose:  125 mls/hr


Lorazepam (Ativan Inj*)  0 mg IV .PER WAM SCORE [facility]


   PRN Reason: Protocol


Melatonin (Melatonin (Nf))  3 mg PO BEDTIME PRN; Protocol


   PRN Reason: Sleep


Mometasone Furoate/Formoterol Fumar (Dulera 200/5 Mdi*)  2 puff INH BID [facility]


   Last Admin: 06/13/17 07:51 Dose:  2 puff


Morphine Sulfate (Morphine Inj (Syringe)*)  1 mg IV Q2H PRN


   PRN Reason: PAIN


Multivitamins/Minerals (Theragran/Minerals Tab*)  1 tab PO DAILY [facility]


   Last Admin: 06/13/17 11:56 Dose:  1 tab


Ondansetron HCl (Zofran Inj*)  4 mg IV Q6H PRN


   PRN Reason: NAUSEA


Prochlorperazine Edisylate (Compazine Inj*)  10 mg IV Q6H PRN


   PRN Reason: NAUSEA


Thiamine HCl (Vitamin B-1 Tab*)  100 mg PO DAILY [facility]


   Last Admin: 06/13/17 11:56 Dose:  100 mg


Tiotropium Bromide (Spiriva Cap.Inh*)  1 cap INH DAILY [facility]


   Last Admin: 06/13/17 07:51 Dose:  1 inh








 Vital Signs











  06/12/17 06/12/17 06/12/17





  17:00 18:00 18:54


 


Temperature   97.4 F


 


Pulse Rate 101  69


 


Respiratory 23 18 18





Rate   


 


Blood Pressure   110/64





(mmHg)   


 


O2 Sat by Pulse 100  88





Oximetry   














  06/12/17 06/12/17 06/12/17





  18:56 19:30 19:32


 


Temperature 97.4 F  97.1 F


 


Pulse Rate 69  100


 


Respiratory 18 22 20





Rate   


 


Blood Pressure 110/64  132/72





(mmHg)   


 


O2 Sat by Pulse 99  100





Oximetry   














  06/12/17 06/12/17 06/12/17





  19:43 20:14 20:43


 


Temperature   


 


Pulse Rate  92 


 


Respiratory 20 17 18





Rate   


 


Blood Pressure   





(mmHg)   


 


O2 Sat by Pulse  97 





Oximetry   














  06/12/17 06/12/17 06/12/17





  22:06 22:07 23:07


 


Temperature   


 


Pulse Rate  67 


 


Respiratory  22 18





Rate   


 


Blood Pressure 122/55 110/51 





(mmHg)   


 


O2 Sat by Pulse  88 





Oximetry   














  06/12/17 06/13/17 06/13/17





  23:55 00:44 01:44


 


Temperature 97.2 F  


 


Pulse Rate 61  


 


Respiratory 20 20 18





Rate   


 


Blood Pressure 115/62  





(mmHg)   


 


O2 Sat by Pulse 84  





Oximetry   














  06/13/17 06/13/17 06/13/17





  02:55 03:00 03:55


 


Temperature 97.3 F  


 


Pulse Rate 113  


 


Respiratory 18 22 24





Rate   


 


Blood Pressure 99/59  





(mmHg)   


 


O2 Sat by Pulse 100  





Oximetry   














  06/13/17 06/13/17 06/13/17





  04:05 04:52 05:52


 


Temperature 96.1 F  


 


Pulse Rate 105  


 


Respiratory 28 24 20





Rate   


 


Blood Pressure 206/105  





(mmHg)   


 


O2 Sat by Pulse 86  





Oximetry   














  06/13/17 06/13/17 06/13/17





  07:55 08:09 10:00


 


Temperature  96.0 F 


 


Pulse Rate  116 


 


Respiratory  16 14





Rate   


 


Blood Pressure  109/52 





(mmHg)   


 


O2 Sat by Pulse 92 92 





Oximetry   














  06/13/17 06/13/17





  14:41 15:34


 


Temperature  


 


Pulse Rate 101 100


 


Respiratory 15 18





Rate  


 


Blood Pressure 117/41 88/59





(mmHg)  


 


O2 Sat by Pulse  





Oximetry  











Oxygen Devices in Use Now: Nasal Cannula


Appearance: elderly, sedated, NAD


Eyes: No Scleral Icterus, PERRLA


Ears/Nose/Mouth/Throat: Mucous Membranes Moist


Neck: NL Appearance and Movements; NL JVP, Trachea Midline


Respiratory: Symmetrical Chest Expansion and Respiratory Effort, - - rhonchi b/l


Cardiovascular: RRR


Abdominal: NL Sounds; No Tenderness; No Distention, No Hepatosplenomegaly


Lymphatic: No Cervical Adenopathy


Extremities: No Edema


Neurological: - - AOx0


Result Diagrams: 


 06/12/17 05:50





 06/12/17 05:50


Additional Lab and Data: 


.


Microbiology and Other Data: 


 Microbiology











 06/10/17 22:45 Nasal Screen MRSA (PCR)(FRANCISCO) - Final





 Nasal    Mrsa Negative














Assess/Plan/Problems-Billing


.


Assessment: 


86 yo man with upper GI bleed found with duodenal ulcer in setting of 

significant ETOH and NSAID use.











- Patient Problems


(1) Alcohol withdrawal delirium


Comment: 


- On [facility] protocol -> decrease ativan dosing per [facility] 6/13   





(2) Anemia due to acute blood loss


Comment: 


s/p 4 units prbc


 following h/h


EGD results noted; appreciate GI consult.


protonix IV   





(3) COPD (chronic obstructive pulmonary disease)


Comment: stable


CPAP for sleep


spiriva


albuterol PRN


check CXR   





(4) Upper GI bleed


Comment: 


- IV PPI


- combination of NSAIDS and etoh use.   





(5) DVT prophylaxis


Comment: SCDs

## 2017-06-14 LAB
ANION GAP SERPL CALC-SCNC: 5 MMOL/L (ref 2–11)
BUN SERPL-MCNC: 44 MG/DL (ref 6–24)
BUN/CREAT SERPL: 40.7 (ref 8–20)
CALCIUM SERPL-MCNC: 7.9 MG/DL (ref 8.6–10.3)
CHLORIDE SERPL-SCNC: 116 MMOL/L (ref 101–111)
GLUCOSE SERPL-MCNC: 97 MG/DL (ref 70–100)
HCO3 SERPL-SCNC: 22 MMOL/L (ref 22–32)
HCT VFR BLD AUTO: 24 % (ref 42–52)
HCT VFR BLD AUTO: 24 % (ref 42–52)
HCT VFR BLD AUTO: 25 % (ref 42–52)
HGB BLD-MCNC: 7.9 G/DL (ref 14–18)
HGB BLD-MCNC: 7.9 G/DL (ref 14–18)
HGB BLD-MCNC: 8.5 G/DL (ref 14–18)
MCH RBC QN AUTO: 31 PG (ref 27–31)
MCHC RBC AUTO-ENTMCNC: 34 G/DL (ref 31–36)
MCV RBC AUTO: 90 FL (ref 80–94)
POTASSIUM SERPL-SCNC: 3.9 MMOL/L (ref 3.5–5)
RBC # BLD AUTO: 2.8 10^6/UL (ref 4–5.4)
SODIUM SERPL-SCNC: 143 MMOL/L (ref 133–145)
WBC # BLD AUTO: 9.7 10^3/UL (ref 3.5–10.8)

## 2017-06-14 RX ADMIN — PANTOPRAZOLE SODIUM SCH MLS/HR: 40 INJECTION, POWDER, FOR SOLUTION INTRAVENOUS at 13:28

## 2017-06-14 RX ADMIN — THERA TABS SCH: TAB at 09:42

## 2017-06-14 RX ADMIN — Medication SCH: at 09:43

## 2017-06-14 RX ADMIN — LORAZEPAM SCH MG: 2 INJECTION INTRAMUSCULAR; INTRAVENOUS at 18:25

## 2017-06-14 RX ADMIN — FUROSEMIDE PRN MG: 10 INJECTION, SOLUTION INTRAMUSCULAR; INTRAVENOUS at 05:25

## 2017-06-14 RX ADMIN — LORAZEPAM SCH MG: 2 INJECTION INTRAMUSCULAR; INTRAVENOUS at 22:21

## 2017-06-14 RX ADMIN — FOLIC ACID SCH: 1 TABLET ORAL at 09:42

## 2017-06-14 RX ADMIN — LORAZEPAM SCH MG: 2 INJECTION INTRAMUSCULAR; INTRAVENOUS at 16:19

## 2017-06-14 RX ADMIN — LORAZEPAM SCH MG: 2 INJECTION INTRAMUSCULAR; INTRAVENOUS at 08:14

## 2017-06-14 RX ADMIN — LORAZEPAM SCH MG: 2 INJECTION INTRAMUSCULAR; INTRAVENOUS at 04:20

## 2017-06-14 RX ADMIN — FUROSEMIDE PRN MG: 10 INJECTION, SOLUTION INTRAMUSCULAR; INTRAVENOUS at 13:27

## 2017-06-14 RX ADMIN — LORAZEPAM SCH MG: 2 INJECTION INTRAMUSCULAR; INTRAVENOUS at 02:05

## 2017-06-14 RX ADMIN — FUROSEMIDE PRN MG: 10 INJECTION, SOLUTION INTRAMUSCULAR; INTRAVENOUS at 09:30

## 2017-06-14 RX ADMIN — LORAZEPAM SCH MG: 2 INJECTION INTRAMUSCULAR; INTRAVENOUS at 06:34

## 2017-06-14 RX ADMIN — LORAZEPAM SCH MG: 2 INJECTION INTRAMUSCULAR; INTRAVENOUS at 10:20

## 2017-06-14 RX ADMIN — PANTOPRAZOLE SODIUM SCH MLS/HR: 40 INJECTION, POWDER, FOR SOLUTION INTRAVENOUS at 02:55

## 2017-06-14 RX ADMIN — PANTOPRAZOLE SODIUM SCH MLS/HR: 40 INJECTION, POWDER, FOR SOLUTION INTRAVENOUS at 22:04

## 2017-06-14 RX ADMIN — TIOTROPIUM BROMIDE SCH: 18 CAPSULE ORAL; RESPIRATORY (INHALATION) at 09:17

## 2017-06-14 RX ADMIN — LORAZEPAM SCH MG: 2 INJECTION INTRAMUSCULAR; INTRAVENOUS at 12:13

## 2017-06-14 RX ADMIN — LORAZEPAM SCH MG: 2 INJECTION INTRAMUSCULAR; INTRAVENOUS at 20:19

## 2017-06-14 RX ADMIN — MOMETASONE FUROATE AND FORMOTEROL FUMARATE DIHYDRATE SCH: 200; 5 AEROSOL RESPIRATORY (INHALATION) at 09:17

## 2017-06-14 NOTE — PN
Progress Note





- Progress Note


Note: 


Notified by nursing of a critical HGB of 4.7, down from 8.9 yesterday in an 87M 

admitted for upper GI hemorrhage 2nd NSAIDs and alcoholism who is currently 

experiencing severe alcoholic withdrawal. Upon my evaluation, he was clinically 

stable, disoriented and mildly agitated, unable to give current status 

information. His BP was 140/90 without tachycardia, RR low 20s. saO2 was 100% 

at the forehead, 88% on finger. HEENT: palpebral fissures pale. Lungs: CTAB. CV

: RRR. Abdomen: SNTND. Extremities: warm & dry. He had had incontinence of 

bowel which did not smell of melena. HGB was rechecked and confirmed accurate. 

Will transfer to ICU, make NPO, 3units pRBCs w/ 20mg furosemide after each. 

Give additional 40mg IV pantoprazole & restart GTT. Case discussed with NILO Burks MD GI who agrees with the aforementioned plan. Anticipate repeat EGD 

in AM.

## 2017-06-14 NOTE — PN
Subjective


Date of Service: 06/14/17


Interval History: 





Pt seen and examined with wife at bedside


Events from yesterday reviewed 


Significant drop in H/H - drop in BP. Transferred to ICU. Now receiving 3rd 

unit of blood with improvement in SBP and HR


Still receiving significant ativan on [facility] but mental status slightly improved 

since yesterday - can recognize and name his wife


Family History: Unchanged from Admission


Social History: Unchanged from Admission


Past Medical History: Unchanged from Admission





Objective


Active Medications: 








Acetaminophen (Tylenol Tab*)  650 mg PO Q6H PRN


   PRN Reason: FEVER/PAIN


   Last Admin: 06/13/17 17:44 Dose:  650 mg


Albuterol (Ventolin 2.5 Mg/3 Ml Neb.Sol*)  2.5 mg INH Q2H PRN


   PRN Reason: SOB/WHEEZING


Folic Acid (Folvite Tab*)  1 mg PO DAILY [facility]


   Last Admin: 06/14/17 09:42 Dose:  Not Given


Furosemide (Lasix Iv*)  20 mg IV .SEE COMMENTS PRN


   PRN Reason: AFTER Completion of pRBC.


   Stop: 06/16/17 00:03


   Last Admin: 06/14/17 09:30 Dose:  20 mg


Pantoprazole Sodium 80 mg/ (Sodium Chloride)  250 mls @ 25 mls/hr IVPB Q10H [facility]


   Last Admin: 06/14/17 02:55 Dose:  25 mls/hr


Lorazepam (Ativan Inj*)  0 mg IV .PER WAM SCORE [facility]


   PRN Reason: Protocol


   Last Admin: 06/14/17 08:14 Dose:  1 mg


Mometasone Furoate/Formoterol Fumar (Dulera 200/5 Mdi*)  2 puff INH BID [facility]


   Last Admin: 06/14/17 09:17 Dose:  Not Given


Multivitamins/Minerals (Theragran/Minerals Tab*)  1 tab PO DAILY [facility]


   Last Admin: 06/14/17 09:42 Dose:  Not Given


Ondansetron HCl (Zofran Inj*)  4 mg IV Q6H PRN


   PRN Reason: NAUSEA


Prochlorperazine Edisylate (Compazine Inj*)  10 mg IV Q6H PRN


   PRN Reason: NAUSEA


Thiamine HCl (Vitamin B-1 Tab*)  100 mg PO DAILY [facility]


   Last Admin: 06/14/17 09:43 Dose:  Not Given


Tiotropium Bromide (Spiriva Cap.Inh*)  1 cap INH DAILY [facility]


   Last Admin: 06/14/17 09:17 Dose:  Not Given








 Vital Signs











  06/13/17 06/13/17 06/13/17





  14:41 15:34 17:35


 


Temperature   


 


Pulse Rate 101 100 95


 


Respiratory 15 18 18





Rate   


 


Blood Pressure 117/41 88/59 106/61





(mmHg)   


 


O2 Sat by Pulse   





Oximetry   














  06/13/17 06/13/17 06/13/17





  20:15 20:21 20:40


 


Temperature  97.6 F 


 


Pulse Rate  93 


 


Respiratory 22  22





Rate   


 


Blood Pressure  116/58 





(mmHg)   


 


O2 Sat by Pulse  90 





Oximetry   














  06/13/17 06/13/17 06/13/17





  21:40 21:44 21:45


 


Temperature   


 


Pulse Rate  95 


 


Respiratory 20 20 





Rate   


 


Blood Pressure   





(mmHg)   


 


O2 Sat by Pulse  84 84





Oximetry   














  06/13/17 06/13/17 06/14/17





  21:59 22:59 00:00


 


Temperature   98.4 F


 


Pulse Rate   68


 


Respiratory 22 18 18





Rate   


 


Blood Pressure   145/94





(mmHg)   


 


O2 Sat by Pulse   100





Oximetry   














  06/14/17 06/14/17 06/14/17





  01:13 01:23 01:30


 


Temperature  98.6 F 


 


Pulse Rate 100 81 102


 


Respiratory 16 16 20





Rate   


 


Blood Pressure 123/106 119/67 119/67





(mmHg)   


 


O2 Sat by Pulse 82 98 100





Oximetry   














  06/14/17 06/14/17 06/14/17





  01:46 01:53 02:00


 


Temperature   


 


Pulse Rate 94  121


 


Respiratory 21 22 23





Rate   


 


Blood Pressure 138/95  





(mmHg)   


 


O2 Sat by Pulse 100  100





Oximetry   














  06/14/17 06/14/17 06/14/17





  02:01 02:05 02:15


 


Temperature   


 


Pulse Rate 115  103


 


Respiratory 21 24 17





Rate   


 


Blood Pressure 145/60  166/109





(mmHg)   


 


O2 Sat by Pulse 100  98





Oximetry   














  06/14/17 06/14/17 06/14/17





  02:45 03:00 03:15


 


Temperature 97.0 F 97.9 F 97.9 F


 


Pulse Rate 101 104 104


 


Respiratory 14 22 15





Rate   


 


Blood Pressure 121/64  143/89





(mmHg)   


 


O2 Sat by Pulse 100 100 100





Oximetry   














  06/14/17 06/14/17 06/14/17





  03:30 04:00 04:20


 


Temperature 97.9 F 97.9 F 


 


Pulse Rate 102 103 


 


Respiratory 19 19 28





Rate   


 


Blood Pressure 120/61 131/111 





(mmHg)   


 


O2 Sat by Pulse 99 99 





Oximetry   














  06/14/17 06/14/17 06/14/17





  04:30 05:00 05:24


 


Temperature 97.8 F 97.8 F 97.8 F


 


Pulse Rate 103 107 103


 


Respiratory 16 20 18





Rate   


 


Blood Pressure 126/101 95/68 124/71





(mmHg)   


 


O2 Sat by Pulse 94 99 100





Oximetry   














  06/14/17 06/14/17 06/14/17





  06:00 06:11 06:34


 


Temperature 97.7 F 97.8 F 


 


Pulse Rate 101 102 


 


Respiratory 18 22 18





Rate   


 


Blood Pressure  132/62 





(mmHg)   


 


O2 Sat by Pulse 100 97 





Oximetry   














  06/14/17 06/14/17 06/14/17





  07:00 07:30 07:33


 


Temperature 97.8 F 97.9 F 97.9 F


 


Pulse Rate 103 101 104


 


Respiratory 20 21 19





Rate   


 


Blood Pressure 116/60 86/68 80/35





(mmHg)   


 


O2 Sat by Pulse 100 100 100





Oximetry   














  06/14/17 06/14/17





  08:14 09:18


 


Temperature  


 


Pulse Rate  95


 


Respiratory 20 17





Rate  


 


Blood Pressure  





(mmHg)  


 


O2 Sat by Pulse  100





Oximetry  











Oxygen Devices in Use Now: Nasal Cannula


Appearance: elderly, NAD


Eyes: No Scleral Icterus, PERRLA


Ears/Nose/Mouth/Throat: - - dry MM, OP clear


Neck: NL Appearance and Movements; NL JVP, Trachea Midline


Respiratory: Symmetrical Chest Expansion and Respiratory Effort, Clear to 

Auscultation


Cardiovascular: - - tachy, no mrg


Abdominal: NL Sounds; No Tenderness; No Distention, No Hepatosplenomegaly


Lymphatic: No Cervical Adenopathy


Extremities: No Edema


Neurological: - - AOx1 to self


Result Diagrams: 


 06/13/17 23:00





 06/13/17 22:24


Additional Lab and Data: 


.


Microbiology and Other Data: 


 Microbiology











 06/10/17 22:45 Nasal Screen MRSA (PCR)([facility]ANCISCO) - Final





 Nasal    Mrsa Negative














Assess/Plan/Problems-Billing


.


Assessment: 


86 yo man with upper GI bleed found with duodenal ulcer in setting of 

significant ETOH and NSAID use now with rebleed











- Patient Problems


(1) Anemia due to acute blood loss


Comment: 


s/p 7 units prbc


continue to trend H/H


Maintain 2 PIV


protonix IV gtt restarted in setting of recurrent bleed


reconsult GI although unclear benefit of rescoping if bleeding abates    





(2) Alcohol withdrawal delirium


Comment: 


On [facility] protocol -> decreased ativan dosing per [facility] 6/13   





(3) COPD (chronic obstructive pulmonary disease)


Comment: stable


CPAP for sleep


spiriva


albuterol PRN


   





(4) Upper GI bleed


Comment: 


- IV PPI gtt


- combination of NSAIDS and etoh use.   





(5) DVT prophylaxis


Comment: SCDs

## 2017-06-15 LAB
ANION GAP SERPL CALC-SCNC: 6 MMOL/L (ref 2–11)
BUN SERPL-MCNC: 44 MG/DL (ref 6–24)
BUN/CREAT SERPL: 36.4 (ref 8–20)
CALCIUM SERPL-MCNC: 7.9 MG/DL (ref 8.6–10.3)
CHLORIDE SERPL-SCNC: 118 MMOL/L (ref 101–111)
GLUCOSE SERPL-MCNC: 113 MG/DL (ref 70–100)
HCO3 SERPL-SCNC: 21 MMOL/L (ref 22–32)
HCT VFR BLD AUTO: 18 % (ref 42–52)
HCT VFR BLD AUTO: 21 % (ref 42–52)
HCT VFR BLD AUTO: 22 % (ref 42–52)
HCT VFR BLD AUTO: 23 % (ref 42–52)
HGB BLD-MCNC: 6 G/DL (ref 14–18)
HGB BLD-MCNC: 7 G/DL (ref 14–18)
HGB BLD-MCNC: 7.3 G/DL (ref 14–18)
HGB BLD-MCNC: 7.6 G/DL (ref 14–18)
POTASSIUM SERPL-SCNC: 3.5 MMOL/L (ref 3.5–5)
SODIUM SERPL-SCNC: 145 MMOL/L (ref 133–145)

## 2017-06-15 PROCEDURE — 02HV33Z INSERTION OF INFUSION DEVICE INTO SUPERIOR VENA CAVA, PERCUTANEOUS APPROACH: ICD-10-PCS | Performed by: RADIOLOGY

## 2017-06-15 RX ADMIN — LORAZEPAM SCH MG: 2 INJECTION INTRAMUSCULAR; INTRAVENOUS at 16:48

## 2017-06-15 RX ADMIN — LORAZEPAM SCH MG: 2 INJECTION INTRAMUSCULAR; INTRAVENOUS at 06:51

## 2017-06-15 RX ADMIN — PANTOPRAZOLE SODIUM SCH MLS/HR: 40 INJECTION, POWDER, FOR SOLUTION INTRAVENOUS at 20:38

## 2017-06-15 RX ADMIN — Medication SCH ML: at 18:39

## 2017-06-15 RX ADMIN — MOMETASONE FUROATE AND FORMOTEROL FUMARATE DIHYDRATE SCH: 200; 5 AEROSOL RESPIRATORY (INHALATION) at 09:15

## 2017-06-15 RX ADMIN — THERA TABS SCH TAB: TAB at 11:25

## 2017-06-15 RX ADMIN — LORAZEPAM SCH MG: 2 INJECTION INTRAMUSCULAR; INTRAVENOUS at 02:30

## 2017-06-15 RX ADMIN — FOLIC ACID SCH MG: 1 TABLET ORAL at 11:26

## 2017-06-15 RX ADMIN — MOMETASONE FUROATE AND FORMOTEROL FUMARATE DIHYDRATE SCH: 200; 5 AEROSOL RESPIRATORY (INHALATION) at 19:42

## 2017-06-15 RX ADMIN — PANTOPRAZOLE SODIUM SCH MLS/HR: 40 INJECTION, POWDER, FOR SOLUTION INTRAVENOUS at 09:20

## 2017-06-15 RX ADMIN — MOMETASONE FUROATE AND FORMOTEROL FUMARATE DIHYDRATE SCH: 200; 5 AEROSOL RESPIRATORY (INHALATION) at 00:21

## 2017-06-15 RX ADMIN — LORAZEPAM SCH MG: 2 INJECTION INTRAMUSCULAR; INTRAVENOUS at 14:49

## 2017-06-15 RX ADMIN — LORAZEPAM SCH MG: 2 INJECTION INTRAMUSCULAR; INTRAVENOUS at 00:25

## 2017-06-15 RX ADMIN — Medication SCH MG: at 11:26

## 2017-06-15 RX ADMIN — TIOTROPIUM BROMIDE SCH: 18 CAPSULE ORAL; RESPIRATORY (INHALATION) at 09:15

## 2017-06-15 RX ADMIN — LORAZEPAM SCH MG: 2 INJECTION INTRAMUSCULAR; INTRAVENOUS at 20:52

## 2017-06-15 RX ADMIN — LORAZEPAM SCH MG: 2 INJECTION INTRAMUSCULAR; INTRAVENOUS at 04:50

## 2017-06-15 NOTE — RAD
Indication: Retroperitoneal hematoma.



CT of the abdomen and pelvis was performed without oral or IV contrast administration.

Coronal and sagittal reconstructed images were obtained.



Lung bases demonstrate atelectasis bilaterally. There may BE early infiltrate in the right

lung base. There is a small to moderate left pleural effusion is noted. The heart is of

normal size without evidence of pericardial effusion.



The liver is normal in size. No focal lesions or intrahepatic duct dilatation is noted.

The gallbladder demonstrates no calcified gallstones. No pericholecystic fluid or wall

thickening is identified. The pancreas indicates no mass or pancreatic ductal dilatation.

The spleen is normal in size.



No adrenal lesions are noted. The kidneys demonstrate no hydronephrosis. The psoas margins

are intact. No definite retroperitoneal hematoma is identified.



Atherosclerotic aorta is noted without aneurysmal dilatation. Common iliac artery and left

external iliac artery is unremarkable.



Degenerative changes of the lumbar spine are noted.



CT of the pelvis demonstrates no pelvic adenopathy or hematoma. Prostate is enlarged. The

urinary bladder demonstrates diffuse wall thickening of uncertain etiology. No small bowel

dilatation is noted. Multiple surgical clips from prior hernia repair.



IMPRESSION: NO DEFINITE RETROPERITONEAL HEMATOMA IS IDENTIFIED. ATHEROSCLEROTIC AORTA IS

NOTED.



LEFT LOWER LOBE ATELECTASIS WITH LEFT PLEURAL EFFUSION IS NOTED. EARLY PNEUMONIA IN THE

RIGHT LUNG BASE.



DIFFUSE WALL THICKENING OF THE URINARY BLADDER IS UNCERTAIN ETIOLOGY.

## 2017-06-15 NOTE — PN
Subjective


Date of Service: 06/15/17


Interval History: 





Seen with sister at bedside


No BM in last 2 days. 


Still requiring ativan. Received extra sedation for placement of PICC as he was 

combative. He is not offering cogent complaints 


Family History: Unchanged from Admission


Social History: Unchanged from Admission


Past Medical History: Unchanged from Admission





Objective


Active Medications: 








Acetaminophen (Tylenol Tab*)  650 mg PO Q6H PRN


   PRN Reason: FEVER/PAIN


   Last Admin: 06/13/17 17:44 Dose:  650 mg


Albuterol (Ventolin 2.5 Mg/3 Ml Neb.Sol*)  2.5 mg INH Q2H PRN


   PRN Reason: SOB/WHEEZING


Folic Acid (Folvite Tab*)  1 mg PO DAILY [facility]


   Last Admin: 06/15/17 11:26 Dose:  1 mg


Pantoprazole Sodium 80 mg/ (Sodium Chloride)  250 mls @ 25 mls/hr IVPB Q10H [facility]


   Last Admin: 06/15/17 09:20 Dose:  25 mls/hr


Lorazepam (Ativan Inj*)  0 mg IV .PER WAM SCORE [facility]


   PRN Reason: Protocol


   Last Admin: 06/15/17 06:51 Dose:  2 mg


Mometasone Furoate/Formoterol Fumar (Dulera 200/5 Mdi*)  2 puff INH BID [facility]


   Last Admin: 06/15/17 00:21 Dose:  Not Given


Multivitamins/Minerals (Theragran/Minerals Tab*)  1 tab PO DAILY [facility]


   Last Admin: 06/15/17 11:25 Dose:  1 tab


Ondansetron HCl (Zofran Inj*)  4 mg IV Q6H PRN


   PRN Reason: NAUSEA


Prochlorperazine Edisylate (Compazine Inj*)  10 mg IV Q6H PRN


   PRN Reason: NAUSEA


Thiamine HCl (Vitamin B-1 Tab*)  100 mg PO DAILY [facility]


   Last Admin: 06/15/17 11:26 Dose:  100 mg


Tiotropium Bromide (Spiriva Cap.Inh*)  1 cap INH DAILY [facility]


   Last Admin: 06/14/17 09:17 Dose:  Not Given








 Vital Signs











  06/14/17 06/14/17 06/14/17





  15:00 15:30 16:00


 


Temperature 99.0 F 99.1 F 99.1 F


 


Pulse Rate 102 105 104


 


Respiratory 18 19 20





Rate   


 


Blood Pressure 115/72 105/81 114/60





(mmHg)   


 


O2 Sat by Pulse 100 99 100





Oximetry   














  06/14/17 06/14/17 06/14/17





  16:09 16:19 16:21


 


Temperature 99.0 F  


 


Pulse Rate 116  


 


Respiratory 20 16 





Rate   


 


Blood Pressure 135/72  





(mmHg)   


 


O2 Sat by Pulse 86  100





Oximetry   














  06/14/17 06/14/17 06/14/17





  16:30 17:00 17:30


 


Temperature 99.1 F 99.4 F 99.6 F


 


Pulse Rate 100 97 95


 


Respiratory 20 23 18





Rate   


 


Blood Pressure 117/60 114/50 103/57





(mmHg)   


 


O2 Sat by Pulse 100 99 97





Oximetry   














  06/14/17 06/14/17 06/14/17





  18:00 18:25 18:31


 


Temperature 99.4 F  99.0 F


 


Pulse Rate 59  


 


Respiratory 18 13 17





Rate   


 


Blood Pressure 149/126  134/74





(mmHg)   


 


O2 Sat by Pulse 75  





Oximetry   














  06/14/17 06/14/17 06/14/17





  19:00 19:05 19:30


 


Temperature 99.0 F 98.9 F 99.1 F


 


Pulse Rate 109 115 101


 


Respiratory 14 23 19





Rate   


 


Blood Pressure 83/41 99/76 106/78





(mmHg)   


 


O2 Sat by Pulse 99 94 97





Oximetry   














  06/14/17 06/14/17 06/14/17





  19:43 20:00 20:19


 


Temperature 99.1 F 99.0 F 


 


Pulse Rate  104 


 


Respiratory  19 28





Rate   


 


Blood Pressure  124/59 





(mmHg)   


 


O2 Sat by Pulse  99 





Oximetry   














  06/14/17 06/14/17 06/14/17





  21:00 21:04 22:00


 


Temperature 99.1 F  99.0 F


 


Pulse Rate 101  60


 


Respiratory 19  16





Rate   


 


Blood Pressure  141/81 





(mmHg)   


 


O2 Sat by Pulse 96  90





Oximetry   














  06/14/17 06/14/17 06/14/17





  22:21 23:00 23:20


 


Temperature  97.7 F 99.2 F


 


Pulse Rate  114 117


 


Respiratory 18 20 19





Rate   


 


Blood Pressure   





(mmHg)   


 


O2 Sat by Pulse  100 100





Oximetry   














  06/14/17 06/15/17 06/15/17





  23:40 00:00 00:15


 


Temperature 99.2 F 98.4 F 


 


Pulse Rate  112 


 


Respiratory  20 





Rate   


 


Blood Pressure   108/70





(mmHg)   


 


O2 Sat by Pulse  100 





Oximetry   














  06/15/17 06/15/17 06/15/17





  00:25 00:55 01:00


 


Temperature   99.0 F


 


Pulse Rate  108 117


 


Respiratory 21 19 17





Rate   


 


Blood Pressure   127/105





(mmHg)   


 


O2 Sat by Pulse  99 99





Oximetry   














  06/15/17 06/15/17 06/15/17





  01:03 02:00 02:21


 


Temperature 98.7 F 92.3 F 99.2 F


 


Pulse Rate 118 110 112


 


Respiratory 21 18 20





Rate   


 


Blood Pressure 116/93 130/104 121/88





(mmHg)   


 


O2 Sat by Pulse 99 97 97





Oximetry   














  06/15/17 06/15/17 06/15/17





  02:30 03:00 03:28


 


Temperature   99.2 F


 


Pulse Rate   


 


Respiratory 20 20 





Rate   


 


Blood Pressure  134/73 





(mmHg)   


 


O2 Sat by Pulse   





Oximetry   














  06/15/17 06/15/17 06/15/17





  04:00 04:50 05:00


 


Temperature 98.5 F  98.9 F


 


Pulse Rate 115  114


 


Respiratory 19 22 18





Rate   


 


Blood Pressure 149/91  113/51





(mmHg)   


 


O2 Sat by Pulse 100  100





Oximetry   














  06/15/17 06/15/17 06/15/17





  06:00 06:01 06:51


 


Temperature 98.6 F 99.1 F 


 


Pulse Rate 116 116 


 


Respiratory 23 21 25





Rate   


 


Blood Pressure  112/72 





(mmHg)   


 


O2 Sat by Pulse 100 100 





Oximetry   














  06/15/17 06/15/17 06/15/17





  07:00 08:00 08:35


 


Temperature 98.9 F 99.0 F 


 


Pulse Rate 110 107 


 


Respiratory 23 23 16





Rate   


 


Blood Pressure 110/30 103/66 





(mmHg)   


 


O2 Sat by Pulse 100 100 





Oximetry   














  06/15/17 06/15/17 06/15/17





  08:50 09:00 09:48


 


Temperature  99.0 F 99.0 F


 


Pulse Rate  103 101


 


Respiratory  21 20





Rate   


 


Blood Pressure   107/64





(mmHg)   


 


O2 Sat by Pulse 100 100 99





Oximetry   














  06/15/17





  10:00


 


Temperature 99.0 F


 


Pulse Rate 100


 


Respiratory 19





Rate 


 


Blood Pressure 105/47





(mmHg) 


 


O2 Sat by Pulse 100





Oximetry 











Oxygen Devices in Use Now: Nasal Cannula


Appearance: elderly, ill appearing, somnulent


Eyes: No Scleral Icterus, PERRLA


Ears/Nose/Mouth/Throat: - - dry MM, OP clear


Neck: NL Appearance and Movements; NL JVP, Trachea Midline


Respiratory: Symmetrical Chest Expansion and Respiratory Effort, Clear to 

Auscultation


Cardiovascular: RRR


Abdominal: NL Sounds; No Tenderness; No Distention, No Hepatosplenomegaly


Lymphatic: No Cervical Adenopathy


Extremities: No Edema


Skin: No Rash or Ulcers


Neurological: - - AOx0


Result Diagrams: 


 06/15/17 09:25





 06/15/17 03:26


Additional Lab and Data: 


.


Microbiology and Other Data: 


 Microbiology











 06/10/17 22:45 Nasal Screen MRSA (PCR)([facility]ANCISCO) - Final





 Nasal    Mrsa Negative














Assess/Plan/Problems-Billing


.


Assessment: 


86 yo man with p/w melena suspected in setting of upper GI bleed found with 

duodenal ulcer in setting of significant ETOH and NSAID use withs trey c/b 

recurrent anemia thought 2/2 blood loss however no e/o continued bleeding as of 

yet 











- Patient Problems


(1) Anemia due to acute blood loss


Comment: 


s/p 7 units prbc


continue to trend H/H


PICC patt ein place 6/15


protonix IV gtt restarted in setting of suspect recurrent bleed


LDH wnl, haptoglobin pending 


check CT A/P to r/o RP bleed


reconsult GI although unclear benefit of rescoping if bleeding abates    





(2) Alcohol withdrawal delirium


Comment: 


On [facility] protocol -> decreased ativan dosing per [facility] 6/13   





(3) COPD (chronic obstructive pulmonary disease)


Comment: stable


CPAP for sleep


spiriva


albuterol PRN


   





(4) Upper GI bleed


Comment: 


- IV PPI gtt


- combination of NSAIDS and etoh use.


unclear if recurrent anemia is 2/2 recurrent bleed


appreciate GI assistance   





(5) DVT prophylaxis


Comment: SCDs

## 2017-06-16 LAB
ADD DIFF/SLIDE REVIEW?: (no result)
ANION GAP SERPL CALC-SCNC: 8 MMOL/L (ref 2–11)
BUN SERPL-MCNC: 44 MG/DL (ref 6–24)
BUN/CREAT SERPL: 34.1 (ref 8–20)
CALCIUM SERPL-MCNC: 8.1 MG/DL (ref 8.6–10.3)
CHLORIDE SERPL-SCNC: 122 MMOL/L (ref 101–111)
GLUCOSE SERPL-MCNC: 105 MG/DL (ref 70–100)
HCO3 SERPL-SCNC: 20 MMOL/L (ref 22–32)
HCT VFR BLD AUTO: 22 % (ref 42–52)
HCT VFR BLD AUTO: 24 % (ref 42–52)
HCT VFR BLD AUTO: 25 % (ref 42–52)
HGB BLD-MCNC: 7.1 G/DL (ref 14–18)
HGB BLD-MCNC: 7.9 G/DL (ref 14–18)
HGB BLD-MCNC: 8 G/DL (ref 14–18)
HYPOCHROMIA BLD QL: (no result)
MCH RBC QN AUTO: 30 PG (ref 27–31)
MCHC RBC AUTO-ENTMCNC: 33 G/DL (ref 31–36)
MCV RBC AUTO: 92 FL (ref 80–94)
POLYCHROMASIA BLD QL SMEAR: (no result)
POTASSIUM SERPL-SCNC: 3.7 MMOL/L (ref 3.5–5)
RBC # BLD AUTO: 2.33 10^6/UL (ref 4–5.4)
SODIUM SERPL-SCNC: 150 MMOL/L (ref 133–145)
WBC # BLD AUTO: 8.7 10^3/UL (ref 3.5–10.8)

## 2017-06-16 RX ADMIN — PANTOPRAZOLE SODIUM SCH MLS/HR: 40 INJECTION, POWDER, FOR SOLUTION INTRAVENOUS at 07:19

## 2017-06-16 RX ADMIN — PANTOPRAZOLE SODIUM SCH MLS/HR: 40 INJECTION, POWDER, FOR SOLUTION INTRAVENOUS at 17:00

## 2017-06-16 RX ADMIN — THERA TABS SCH: TAB at 11:14

## 2017-06-16 RX ADMIN — TIOTROPIUM BROMIDE SCH: 18 CAPSULE ORAL; RESPIRATORY (INHALATION) at 08:13

## 2017-06-16 RX ADMIN — LORAZEPAM SCH MG: 2 INJECTION INTRAMUSCULAR; INTRAVENOUS at 19:58

## 2017-06-16 RX ADMIN — Medication SCH: at 11:14

## 2017-06-16 RX ADMIN — MOMETASONE FUROATE AND FORMOTEROL FUMARATE DIHYDRATE SCH: 200; 5 AEROSOL RESPIRATORY (INHALATION) at 08:13

## 2017-06-16 RX ADMIN — Medication SCH: at 17:23

## 2017-06-16 RX ADMIN — DEXTROSE MONOHYDRATE AND SODIUM CHLORIDE SCH MLS/HR: 5; .45 INJECTION, SOLUTION INTRAVENOUS at 17:22

## 2017-06-16 RX ADMIN — Medication SCH: at 06:17

## 2017-06-16 RX ADMIN — MOMETASONE FUROATE AND FORMOTEROL FUMARATE DIHYDRATE SCH: 200; 5 AEROSOL RESPIRATORY (INHALATION) at 20:14

## 2017-06-16 RX ADMIN — LORAZEPAM SCH MG: 2 INJECTION INTRAMUSCULAR; INTRAVENOUS at 01:21

## 2017-06-16 RX ADMIN — LORAZEPAM SCH MG: 2 INJECTION INTRAMUSCULAR; INTRAVENOUS at 21:21

## 2017-06-16 RX ADMIN — FOLIC ACID SCH: 1 TABLET ORAL at 11:14

## 2017-06-16 NOTE — PN
Subjective


Date of Service: 06/16/17


Interval History: 





Events reviewed


Agitated overnight and started on ativan gtt 


No BMs, no evidence of blood from below or above. No emesis. Significantly 

sedated this AM 


+cough


Family History: Unchanged from Admission


Social History: Unchanged from Admission


Past Medical History: Unchanged from Admission





Objective


Active Medications: 








Acetaminophen (Tylenol Tab*)  650 mg PO Q6H PRN


   PRN Reason: FEVER/PAIN


   Last Admin: 06/13/17 17:44 Dose:  650 mg


Albuterol (Ventolin 2.5 Mg/3 Ml Neb.Sol*)  2.5 mg INH Q2H PRN


   PRN Reason: SOB/WHEEZING


Folic Acid (Folvite Tab*)  1 mg PO DAILY [facility]


   Last Admin: 06/16/17 11:14 Dose:  Not Given


Heparin Sodium (Porcine) (Heparin Flush Picc/Ml/Cvc(*))  0 ml FLUSH 0600,1800 

[facility]


   Last Admin: 06/16/17 06:17 Dose:  Not Given


Pantoprazole Sodium 80 mg/ (Sodium Chloride)  250 mls @ 25 mls/hr IVPB Q10H [facility]


   Last Admin: 06/16/17 07:19 Dose:  25 mls/hr


Dextrose/Sodium Chloride (D5w 1/2 Ns 1000 Ml Bag*)  1,000 mls @ 100 mls/hr IV 

PER RATE [facility]


   Stop: 06/18/17 02:59


Lorazepam (Ativan Inj*)  0 mg IV .PER WAM SCORE [facility]


   PRN Reason: Protocol


   Last Admin: 06/16/17 01:21 Dose:  3 mg


Mometasone Furoate/Formoterol Fumar (Dulera 200/5 Mdi*)  2 puff INH BID [facility]


   Last Admin: 06/16/17 08:13 Dose:  Not Given


Multivitamins/Minerals (Theragran/Minerals Tab*)  1 tab PO DAILY [facility]


   Last Admin: 06/16/17 11:14 Dose:  Not Given


Ondansetron HCl (Zofran Inj*)  4 mg IV Q6H PRN


   PRN Reason: NAUSEA


Prochlorperazine Edisylate (Compazine Inj*)  10 mg IV Q6H PRN


   PRN Reason: NAUSEA


Thiamine HCl (Vitamin B-1 Tab*)  100 mg PO DAILY [facility]


   Last Admin: 06/16/17 11:14 Dose:  Not Given


Tiotropium Bromide (Spiriva Cap.Inh*)  1 cap INH DAILY [facility]


   Last Admin: 06/16/17 08:13 Dose:  Not Given








 Vital Signs











  06/15/17 06/15/17 06/15/17





  18:00 18:01 19:00


 


Temperature 98.0 F 98.2 F 98.9 F


 


Pulse Rate 111 111 108


 


Respiratory 22 22 22





Rate   


 


Blood Pressure  119/83 134/117





(mmHg)   


 


O2 Sat by Pulse 97 96 97





Oximetry   














  06/15/17 06/15/17 06/15/17





  19:36 20:00 20:52


 


Temperature 99.2 F 99.4 F 


 


Pulse Rate  120 


 


Respiratory  22 22





Rate   


 


Blood Pressure  142/75 





(mmHg)   


 


O2 Sat by Pulse  96 





Oximetry   














  06/15/17 06/15/17 06/15/17





  21:00 21:21 21:46


 


Temperature 99.7 F  99.9 F


 


Pulse Rate 122 113 117


 


Respiratory 23  21





Rate   


 


Blood Pressure 133/87  





(mmHg)   


 


O2 Sat by Pulse 96 96 100





Oximetry   














  06/15/17 06/15/17 06/15/17





  22:00 22:01 22:02


 


Temperature 99.6 F 98.8 F 98.3 F


 


Pulse Rate 120 121 122


 


Respiratory 21 23 23





Rate   


 


Blood Pressure  106/82 143/46





(mmHg)   


 


O2 Sat by Pulse 98 99 99





Oximetry   














  06/15/17 06/15/17 06/16/17





  23:00 23:29 00:00


 


Temperature 99.1 F 99.6 F 99.2 F


 


Pulse Rate 118  114


 


Respiratory 22  21





Rate   


 


Blood Pressure 144/31  





(mmHg)   


 


O2 Sat by Pulse 99  98





Oximetry   














  06/16/17 06/16/17 06/16/17





  00:01 00:07 01:00


 


Temperature   99.6 F


 


Pulse Rate   109


 


Respiratory   22





Rate   


 


Blood Pressure 145/56  159/65





(mmHg)   


 


O2 Sat by Pulse  99 100





Oximetry   














  06/16/17 06/16/17 06/16/17





  01:21 02:00 02:52


 


Temperature  98.9 F 


 


Pulse Rate  109 


 


Respiratory 21 21 19





Rate   


 


Blood Pressure  159/113 





(mmHg)   


 


O2 Sat by Pulse  99 





Oximetry   














  06/16/17 06/16/17 06/16/17





  03:00 03:01 03:32


 


Temperature 98.2 F 97.1 F 98.1 F


 


Pulse Rate 110 110 


 


Respiratory 21 18 





Rate   


 


Blood Pressure   





(mmHg)   


 


O2 Sat by Pulse 99 100 





Oximetry   














  06/16/17 06/16/17 06/16/17





  03:46 03:51 04:00


 


Temperature 97.9 F 97.8 F 98.3 F


 


Pulse Rate 109 110 117


 


Respiratory 22 23 17





Rate   


 


Blood Pressure 124/14 108/51 105/85





(mmHg)   


 


O2 Sat by Pulse 100 98 98





Oximetry   














  06/16/17 06/16/17 06/16/17





  05:00 05:40 06:00


 


Temperature 99.0 F 98.1 F 98.9 F


 


Pulse Rate 110 110 109


 


Respiratory 22 24 18





Rate   


 


Blood Pressure 99/20 96/84 99/85





(mmHg)   


 


O2 Sat by Pulse 99 98 98





Oximetry   














  06/16/17 06/16/17 06/16/17





  07:00 07:01 07:13


 


Temperature 98.8 F 98.8 F 


 


Pulse Rate 111 112 


 


Respiratory 25 25 25





Rate   


 


Blood Pressure  121/82 





(mmHg)   


 


O2 Sat by Pulse 99 99 





Oximetry   














  06/16/17 06/16/17 06/16/17





  08:00 08:10 08:11


 


Temperature 98.3 F  


 


Pulse Rate 101 96 


 


Respiratory 19 20 





Rate   


 


Blood Pressure 102/76  





(mmHg)   


 


O2 Sat by Pulse 100 100 100





Oximetry   














  06/16/17 06/16/17 06/16/17





  09:00 10:00 11:00


 


Temperature 98.3 F 97.9 F 98.2 F


 


Pulse Rate 103 99 101


 


Respiratory 23 21 23





Rate   


 


Blood Pressure 128/31 128/16 123/41





(mmHg)   


 


O2 Sat by Pulse 100 100 100





Oximetry   














  06/16/17 06/16/17 06/16/17





  11:12 11:27 12:00


 


Temperature  98.4 F 98.3 F


 


Pulse Rate  100 100


 


Respiratory 20 21 20





Rate   


 


Blood Pressure  137/49 124/33





(mmHg)   


 


O2 Sat by Pulse  100 100





Oximetry   














  06/16/17 06/16/17 06/16/17





  13:00 13:40 13:44


 


Temperature 98.8 F 98.6 F 98.6 F


 


Pulse Rate 99 100 


 


Respiratory 23 18 





Rate   


 


Blood Pressure 120/102 106/65 





(mmHg)   


 


O2 Sat by Pulse 100 100 





Oximetry   














  06/16/17 06/16/17 06/16/17





  13:51 14:00 15:00


 


Temperature  99.1 F 99.1 F


 


Pulse Rate  100 107


 


Respiratory 25 21 19





Rate   


 


Blood Pressure  118/42 126/27





(mmHg)   


 


O2 Sat by Pulse  100 100





Oximetry   














  06/16/17 06/16/17 06/16/17





  15:31 15:55 16:00


 


Temperature   99.1 F


 


Pulse Rate   105


 


Respiratory 24 22 22





Rate   


 


Blood Pressure   111/67





(mmHg)   


 


O2 Sat by Pulse   97





Oximetry   











Oxygen Devices in Use Now: Nasal Cannula


Appearance: elderly, ill appearing, sedated on on ativan gtt, no distress


Eyes: No Scleral Icterus, PERRLA


Ears/Nose/Mouth/Throat: - - dry MM


Neck: NL Appearance and Movements; NL JVP, Trachea Midline


Respiratory: Symmetrical Chest Expansion and Respiratory Effort, - - rhonchi b/l


Cardiovascular: - - tachy


Abdominal: NL Sounds; No Tenderness; No Distention, No Hepatosplenomegaly


Lymphatic: No Cervical Adenopathy


Extremities: No Edema, No Clubbing, Cyanosis


Skin: No Rash or Ulcers, - - full body skin exam, n olarge echymosis to explain 

anemia. Right hand and arm has significant bruising 


Neurological: - - AOx0, moving all extremities 


Result Diagrams: 


 06/16/17 15:30





 06/16/17 05:25


Additional Lab and Data: 


.


Microbiology and Other Data: 


 Microbiology











 06/10/17 22:45 Nasal Screen MRSA (PCR)(FRANCISCO) - Final





 Nasal    Mrsa Negative














Assess/Plan/Problems-Billing


.


Assessment: 


86 yo man with p/w melena suspected in setting of upper GI bleed found with 

duodenal ulcer in setting of significant ETOH and NSAID use with stay c/b 

recurrent anemia thought 2/2 blood loss however no e/o continued bleeding as of 

yet 











- Patient Problems


(1) Anemia due to acute blood loss


Comment: 


s/p 8 units prbc this stay


continue to trend H/H


PICC line in place 6/15


protonix IV gtt restarted in setting of suspect recurrent bleed


LDH wnl, haptoglobin pending 


CT A/P without RP bleed


no obvious active bleeding


1 unit PRBC 6/16 in setting of continued tachycardia   





(2) Alcohol withdrawal delirium


Comment: 


On [facility] protocol -> decreased ativan dosing per [facility] 6/13


stop ativan gtt which was started overnight 6/16   





(3) COPD (chronic obstructive pulmonary disease)


Comment: stable


CPAP for sleep


spiriva


albuterol PRN


   





(4) Upper GI bleed


Comment: 


IV PPI gtt


combination of NSAIDS and etoh use.


unclear if recurrent anemia is 2/2 recurrent bleed


appreciate GI assistance   





(5) DVT prophylaxis


Comment: SCDs

## 2017-06-16 NOTE — RAD
Indication: Evaluate for HCAP.



Single frontal view of the chest performed at 0855 hours was reviewed.



Comparison is made with previous exam dated June 13, 2017.



No mediastinal shift is noted. Heart is of normal size and configuration. Lung fields

appear clear. Additionally left-sided PICC line is in place with the tip in the superior

vena cava.



IMPRESSION: NO ACTIVE CARDIOPULMONARY DISEASE IS NOTED.

## 2017-06-17 LAB
ANION GAP SERPL CALC-SCNC: 6 MMOL/L (ref 2–11)
BUN SERPL-MCNC: 31 MG/DL (ref 6–24)
BUN/CREAT SERPL: 27.2 (ref 8–20)
CALCIUM SERPL-MCNC: 8 MG/DL (ref 8.6–10.3)
CHLORIDE SERPL-SCNC: 125 MMOL/L (ref 101–111)
GLUCOSE SERPL-MCNC: 129 MG/DL (ref 70–100)
HCO3 SERPL-SCNC: 18 MMOL/L (ref 22–32)
HCT VFR BLD AUTO: 24 % (ref 42–52)
HGB BLD-MCNC: 7.8 G/DL (ref 14–18)
MCH RBC QN AUTO: 30 PG (ref 27–31)
MCHC RBC AUTO-ENTMCNC: 33 G/DL (ref 31–36)
MCV RBC AUTO: 92 FL (ref 80–94)
POTASSIUM SERPL-SCNC: 3.3 MMOL/L (ref 3.5–5)
RBC # BLD AUTO: 2.61 10^6/UL (ref 4–5.4)
SODIUM SERPL-SCNC: 149 MMOL/L (ref 133–145)
WBC # BLD AUTO: 7.6 10^3/UL (ref 3.5–10.8)

## 2017-06-17 RX ADMIN — PANTOPRAZOLE SODIUM SCH: 40 INJECTION, POWDER, FOR SOLUTION INTRAVENOUS at 14:01

## 2017-06-17 RX ADMIN — FOLIC ACID SCH MG: 1 TABLET ORAL at 11:50

## 2017-06-17 RX ADMIN — LORAZEPAM SCH MG: 2 INJECTION INTRAMUSCULAR; INTRAVENOUS at 11:01

## 2017-06-17 RX ADMIN — Medication SCH: at 04:10

## 2017-06-17 RX ADMIN — FERROUS SULFATE TAB 325 MG (65 MG ELEMENTAL FE) SCH MG: 325 (65 FE) TAB at 11:50

## 2017-06-17 RX ADMIN — DEXTROSE MONOHYDRATE AND SODIUM CHLORIDE SCH MLS/HR: 5; .45 INJECTION, SOLUTION INTRAVENOUS at 02:42

## 2017-06-17 RX ADMIN — ACETAMINOPHEN PRN MG: 325 TABLET ORAL at 21:38

## 2017-06-17 RX ADMIN — POLYETHYLENE GLYCOL 3350 PRN GM: 17 POWDER, FOR SOLUTION ORAL at 11:07

## 2017-06-17 RX ADMIN — FERROUS SULFATE TAB 325 MG (65 MG ELEMENTAL FE) SCH MG: 325 (65 FE) TAB at 21:39

## 2017-06-17 RX ADMIN — THERA TABS SCH TAB: TAB at 11:03

## 2017-06-17 RX ADMIN — MOMETASONE FUROATE AND FORMOTEROL FUMARATE DIHYDRATE SCH: 200; 5 AEROSOL RESPIRATORY (INHALATION) at 20:02

## 2017-06-17 RX ADMIN — Medication SCH ML: at 17:22

## 2017-06-17 RX ADMIN — PANTOPRAZOLE SODIUM SCH MG: 40 INJECTION, POWDER, FOR SOLUTION INTRAVENOUS at 21:40

## 2017-06-17 RX ADMIN — Medication SCH MG: at 11:03

## 2017-06-17 RX ADMIN — LORAZEPAM SCH MG: 2 INJECTION INTRAMUSCULAR; INTRAVENOUS at 01:15

## 2017-06-17 RX ADMIN — TIOTROPIUM BROMIDE SCH: 18 CAPSULE ORAL; RESPIRATORY (INHALATION) at 08:01

## 2017-06-17 RX ADMIN — LORAZEPAM SCH MG: 2 INJECTION INTRAMUSCULAR; INTRAVENOUS at 17:22

## 2017-06-17 RX ADMIN — PANTOPRAZOLE SODIUM SCH MLS/HR: 40 INJECTION, POWDER, FOR SOLUTION INTRAVENOUS at 02:42

## 2017-06-17 RX ADMIN — MOMETASONE FUROATE AND FORMOTEROL FUMARATE DIHYDRATE SCH: 200; 5 AEROSOL RESPIRATORY (INHALATION) at 08:01

## 2017-06-17 RX ADMIN — DOCUSATE SODIUM SCH: 100 CAPSULE, LIQUID FILLED ORAL at 11:07

## 2017-06-17 RX ADMIN — CEFTRIAXONE SODIUM SCH MLS/HR: 1 INJECTION, POWDER, FOR SOLUTION INTRAMUSCULAR; INTRAVENOUS at 12:59

## 2017-06-17 NOTE — PN
Subjective


Date of Service: 06/17/17


Interval History: 





Still sedated but a little more awake today. Mumbling to himself. Unable to 

relay any information. 


Family History: Unchanged from Admission


Social History: Unchanged from Admission


Past Medical History: Unchanged from Admission





Objective


Active Medications: 








Acetaminophen (Tylenol Tab*)  650 mg PO Q6H PRN


   PRN Reason: FEVER/PAIN


   Last Admin: 06/13/17 17:44 Dose:  650 mg


Albuterol (Ventolin 2.5 Mg/3 Ml Neb.Sol*)  2.5 mg INH Q2H PRN


   PRN Reason: SOB/WHEEZING


Docusate Sodium (Colace Cap*)  200 mg PO DAILY [facility]


   Last Admin: 06/17/17 11:07 Dose:  Not Given


Ferrous Sulfate (Ferrous Sulfate Tab*)  325 mg PO BID [facility]


   Last Admin: 06/17/17 11:50 Dose:  325 mg


Folic Acid (Folvite Tab*)  1 mg PO DAILY [facility]


   Last Admin: 06/17/17 11:50 Dose:  1 mg


Heparin Sodium (Porcine) (Heparin Flush Picc/Ml/Cvc(*))  0 ml FLUSH 0600,1800 

[facility]


   Last Admin: 06/17/17 04:10 Dose:  Not Given


Potassium Chloride/Dextrose (D5w 20 Meq Kcl 1000 Ml Bag*)  1,000 mls @ 125 mls/

hr IV PER RATE [facility]


   Stop: 06/17/17 16:59


   Last Admin: 06/17/17 10:08 Dose:  125 mls/hr


Ceftriaxone Sodium 1,000 mg/ (Sodium Chloride)  50 mls @ 200 mls/hr IVPB Q24H 

[facility]


   Last Admin: 06/17/17 12:59 Dose:  200 mls/hr


Lorazepam (Ativan Inj*)  0 mg IV .PER WAM SCORE [facility]


   PRN Reason: Protocol


   Last Admin: 06/17/17 11:01 Dose:  1.5 mg


Mometasone Furoate/Formoterol Fumar (Dulera 200/5 Mdi*)  2 puff INH BID [facility]


   Last Admin: 06/17/17 08:01 Dose:  Not Given


Multivitamins/Minerals (Theragran/Minerals Tab*)  1 tab PO DAILY [facility]


   Last Admin: 06/17/17 11:03 Dose:  1 tab


Ondansetron HCl (Zofran Inj*)  4 mg IV Q6H PRN


   PRN Reason: NAUSEA


Pantoprazole Sodium (Protonix Iv*)  40 mg IV BID [facility]


Polyethylene Glycol/Electrolytes (Miralax*)  17 gm PO DAILY PRN


   PRN Reason: CONSTIPATION


   Last Admin: 06/17/17 11:07 Dose:  17 gm


Prochlorperazine Edisylate (Compazine Inj*)  10 mg IV Q6H PRN


   PRN Reason: NAUSEA


Thiamine HCl (Vitamin B-1 Tab*)  100 mg PO DAILY [facility]


   Last Admin: 06/17/17 11:03 Dose:  100 mg


Tiotropium Bromide (Spiriva Cap.Inh*)  1 cap INH DAILY [facility]


   Last Admin: 06/17/17 08:01 Dose:  Not Given








 Vital Signs











  06/16/17 06/16/17 06/16/17





  17:00 18:00 18:01


 


Temperature 99.0 F 99.2 F 97.9 F


 


Pulse Rate 106 108 107


 


Respiratory 24 21 20





Rate   


 


Blood Pressure 118/61  164/139





(mmHg)   


 


O2 Sat by Pulse 97 96 97





Oximetry   














  06/16/17 06/16/17 06/16/17





  19:00 19:01 19:28


 


Temperature 99.3 F 99.0 F 


 


Pulse Rate 111 112 108


 


Respiratory 22 23 





Rate   


 


Blood Pressure  145/97 





(mmHg)   


 


O2 Sat by Pulse 99 98 96





Oximetry   














  06/16/17 06/16/17 06/16/17





  19:58 20:00 21:00


 


Temperature  99.3 F 98.4 F


 


Pulse Rate  111 112


 


Respiratory 21 19 22





Rate   


 


Blood Pressure  144/34 





(mmHg)   


 


O2 Sat by Pulse  96 97





Oximetry   














  06/16/17 06/16/17 06/16/17





  21:15 21:16 21:21


 


Temperature  99.9 F 


 


Pulse Rate  108 


 


Respiratory 21 25 25





Rate   


 


Blood Pressure  152/27 





(mmHg)   


 


O2 Sat by Pulse  98 





Oximetry   














  06/16/17 06/16/17 06/16/17





  21:47 21:53 22:00


 


Temperature   


 


Pulse Rate 106  105


 


Respiratory 23 28 21





Rate   


 


Blood Pressure 120/83  136/55





(mmHg)   


 


O2 Sat by Pulse 99  100





Oximetry   














  06/16/17 06/16/17 06/16/17





  22:15 22:30 22:42


 


Temperature   


 


Pulse Rate 108 105 105


 


Respiratory 24 26 21





Rate   


 


Blood Pressure 165/47  





(mmHg)   


 


O2 Sat by Pulse 98 100 98





Oximetry   














  06/16/17 06/16/17 06/16/17





  22:48 23:00 23:07


 


Temperature 99.8 F  


 


Pulse Rate 96 109 108


 


Respiratory 25 21 24





Rate   


 


Blood Pressure 110/60  





(mmHg)   


 


O2 Sat by Pulse 96 98 98





Oximetry   














  06/16/17 06/16/17 06/17/17





  23:09 23:14 00:00


 


Temperature  99.8 F 99.2 F


 


Pulse Rate  107 104


 


Respiratory  16 23





Rate   


 


Blood Pressure 148/95  





(mmHg)   


 


O2 Sat by Pulse  94 98





Oximetry   














  06/17/17 06/17/17 06/17/17





  00:01 01:00 01:15


 


Temperature 99.0 F  


 


Pulse Rate 103  


 


Respiratory 24 16 18





Rate   


 


Blood Pressure 125/50 128/47 





(mmHg)   


 


O2 Sat by Pulse 98  





Oximetry   














  06/17/17 06/17/17 06/17/17





  02:00 03:00 04:00


 


Temperature 99.4 F 99.8 F 99.8 F


 


Pulse Rate 106 105 111


 


Respiratory 23 26 23





Rate   


 


Blood Pressure 122/36 130/84 143/34





(mmHg)   


 


O2 Sat by Pulse 98 97 97





Oximetry   














  06/17/17 06/17/17 06/17/17





  04:26 05:00 05:35


 


Temperature  98.2 F 


 


Pulse Rate  107 


 


Respiratory 19 22 25





Rate   


 


Blood Pressure  99/70 





(mmHg)   


 


O2 Sat by Pulse  97 





Oximetry   














  06/17/17 06/17/17 06/17/17





  06:00 06:50 07:00


 


Temperature 100.0 F  100.2 F


 


Pulse Rate 101  105


 


Respiratory 25 28 20





Rate   


 


Blood Pressure 115/49  131/62





(mmHg)   


 


O2 Sat by Pulse 97  96





Oximetry   














  06/17/17 06/17/17 06/17/17





  08:00 08:04 09:00


 


Temperature 100.0 F  100.3 F


 


Pulse Rate 109 108 110


 


Respiratory 22 24 20





Rate   


 


Blood Pressure 120/46  135/57





(mmHg)   


 


O2 Sat by Pulse 95 96 96





Oximetry   














  06/17/17 06/17/17 06/17/17





  10:00 11:00 11:01


 


Temperature 99.7 F  


 


Pulse Rate 115 124 


 


Respiratory 26 25 23





Rate   


 


Blood Pressure 121/63 143/76 





(mmHg)   


 


O2 Sat by Pulse 95 94 





Oximetry   














  06/17/17 06/17/17 06/17/17





  11:47 11:57 12:00


 


Temperature 101.6 F  101.6 F


 


Pulse Rate  110 111


 


Respiratory  21 25





Rate   


 


Blood Pressure  134/69 127/56





(mmHg)   


 


O2 Sat by Pulse  96 96





Oximetry   














  06/17/17 06/17/17 06/17/17





  13:00 14:00 14:51


 


Temperature   101.9 F


 


Pulse Rate 114 111 


 


Respiratory 19 25 





Rate   


 


Blood Pressure 125/60 116/56 





(mmHg)   


 


O2 Sat by Pulse 96 94 





Oximetry   














  06/17/17 06/17/17 06/17/17





  15:00 15:05 15:07


 


Temperature   


 


Pulse Rate 105 103 104


 


Respiratory 24 27 28





Rate   


 


Blood Pressure  78/42 132/58





(mmHg)   


 


O2 Sat by Pulse 94 91 92





Oximetry   











Oxygen Devices in Use Now: Nasal Cannula


Appearance: ill appearing


Eyes: No Scleral Icterus, PERRLA


Ears/Nose/Mouth/Throat: Clear Oropharnyx, Mucous Membranes Moist


Neck: NL Appearance and Movements; NL JVP, Trachea Midline


Respiratory: Symmetrical Chest Expansion and Respiratory Effort, - - rhonchi b/l


Cardiovascular: RRR


Abdominal: NL Sounds; No Tenderness; No Distention, No Hepatosplenomegaly


Lymphatic: No Cervical Adenopathy


Extremities: No Edema, No Clubbing, Cyanosis


Skin: - - extensive ecchymosis on right forearm


Neurological: - - AOx0, moves all extremities


Result Diagrams: 


 06/17/17 04:50





 06/17/17 04:50


Additional Lab and Data: 


.


Microbiology and Other Data: 


 Microbiology











 06/10/17 22:45 Nasal Screen MRSA (PCR)([facility]) - Final





 Nasal    Mrsa Negative














Assess/Plan/Problems-Billing


.


Assessment: 


88 yo man with p/w melena suspected in setting of upper GI bleed found with 

duodenal ulcer in setting of significant ETOH and NSAID use with stay c/b 

recurrent anemia thought 2/2 blood loss however no e/o continued bleeding as of 

yet all in setting of withdrawal from alcohol











- Patient Problems


(1) Hypernatremia


Comment:  D5W with 20mew K x 1 liter , recheck in AM    





(2) Fever


Comment: new fevers


check WBC with AM labs 


CXR wnl yesterday although pt with cough 


tejada out, will be difficult to collect urine


check blood cultures 


Started ceftriaxone 6/17   





(3) Anemia due to acute blood loss


Comment: 


s/p 9 units prbc this stay


PICC line in place 6/15


protonix IV gtt transitioned to IV PPI BID after 72 hrs


LDH wnl, haptoglobin wnl argues against hemolysis 


CT A/P without RP bleed


no obvious active bleeding


   





(4) Alcohol withdrawal delirium


Comment: 


On WAM protocol -> decreased ativan dosing per [facility] 6/17


   





(5) COPD (chronic obstructive pulmonary disease)


Comment: stable


CPAP for sleep


spiriva


albuterol PRN


   





(6) Upper GI bleed


Comment: 


IV PPI BID


combination of NSAIDS and etoh use.


unclear if recurrent anemia is 2/2 recurrent bleed


appreciate GI assistance. No plan on repeat EGD at this point   





(7) DVT prophylaxis


Comment: SCDs

## 2017-06-18 LAB
ANION GAP SERPL CALC-SCNC: 6 MMOL/L (ref 2–11)
BUN SERPL-MCNC: 24 MG/DL (ref 6–24)
BUN/CREAT SERPL: 19.5 (ref 8–20)
CALCIUM SERPL-MCNC: 8.2 MG/DL (ref 8.6–10.3)
CHLORIDE SERPL-SCNC: 120 MMOL/L (ref 101–111)
GLUCOSE SERPL-MCNC: 88 MG/DL (ref 70–100)
HCO3 SERPL-SCNC: 20 MMOL/L (ref 22–32)
HCT VFR BLD AUTO: 24 % (ref 42–52)
HGB BLD-MCNC: 7.8 G/DL (ref 14–18)
MAGNESIUM SERPL-MCNC: 2 MG/DL (ref 1.9–2.7)
MCH RBC QN AUTO: 29 PG (ref 27–31)
MCHC RBC AUTO-ENTMCNC: 32 G/DL (ref 31–36)
MCV RBC AUTO: 92 FL (ref 80–94)
POTASSIUM SERPL-SCNC: 3.3 MMOL/L (ref 3.5–5)
RBC # BLD AUTO: 2.67 10^6/UL (ref 4–5.4)
SODIUM SERPL-SCNC: 146 MMOL/L (ref 133–145)
WBC # BLD AUTO: 6.1 10^3/UL (ref 3.5–10.8)

## 2017-06-18 RX ADMIN — Medication SCH MG: at 10:32

## 2017-06-18 RX ADMIN — FOLIC ACID SCH MG: 1 TABLET ORAL at 10:33

## 2017-06-18 RX ADMIN — Medication SCH ML: at 17:31

## 2017-06-18 RX ADMIN — DOCUSATE SODIUM SCH: 100 CAPSULE, LIQUID FILLED ORAL at 10:30

## 2017-06-18 RX ADMIN — LORAZEPAM SCH MG: 2 INJECTION INTRAMUSCULAR; INTRAVENOUS at 05:43

## 2017-06-18 RX ADMIN — PANTOPRAZOLE SODIUM SCH MG: 40 INJECTION, POWDER, FOR SOLUTION INTRAVENOUS at 12:07

## 2017-06-18 RX ADMIN — LORAZEPAM PRN MG: 2 INJECTION INTRAMUSCULAR; INTRAVENOUS at 20:51

## 2017-06-18 RX ADMIN — TIOTROPIUM BROMIDE SCH CAP: 18 CAPSULE ORAL; RESPIRATORY (INHALATION) at 08:34

## 2017-06-18 RX ADMIN — CHLORDIAZEPOXIDE HYDROCHLORIDE PRN MG: 25 CAPSULE ORAL at 17:50

## 2017-06-18 RX ADMIN — THERA TABS SCH TAB: TAB at 10:36

## 2017-06-18 RX ADMIN — DEXTROSE MONOHYDRATE, SODIUM CHLORIDE, AND POTASSIUM CHLORIDE SCH MLS/HR: 50; 2.25; 1.49 INJECTION, SOLUTION INTRAVENOUS at 12:07

## 2017-06-18 RX ADMIN — CEFTRIAXONE SODIUM SCH MLS/HR: 1 INJECTION, POWDER, FOR SOLUTION INTRAMUSCULAR; INTRAVENOUS at 12:10

## 2017-06-18 RX ADMIN — MOMETASONE FUROATE AND FORMOTEROL FUMARATE DIHYDRATE SCH PUFF: 200; 5 AEROSOL RESPIRATORY (INHALATION) at 21:31

## 2017-06-18 RX ADMIN — LORAZEPAM SCH MG: 2 INJECTION INTRAMUSCULAR; INTRAVENOUS at 03:16

## 2017-06-18 RX ADMIN — Medication SCH ML: at 05:43

## 2017-06-18 RX ADMIN — MOMETASONE FUROATE AND FORMOTEROL FUMARATE DIHYDRATE SCH PUFF: 200; 5 AEROSOL RESPIRATORY (INHALATION) at 08:34

## 2017-06-18 RX ADMIN — PANTOPRAZOLE SODIUM SCH MG: 40 INJECTION, POWDER, FOR SOLUTION INTRAVENOUS at 20:51

## 2017-06-18 RX ADMIN — FERROUS SULFATE TAB 325 MG (65 MG ELEMENTAL FE) SCH: 325 (65 FE) TAB at 11:09

## 2017-06-18 NOTE — PN
Subjective


Date of Service: 06/18/17


Interval History: 





Patient not able to make his needs known. 


Family History: Unchanged from Admission


Social History: Unchanged from Admission


Past Medical History: Unchanged from Admission





Objective


Active Medications: 








Acetaminophen (Tylenol Tab*)  650 mg PO Q6H PRN


   PRN Reason: FEVER/PAIN


   Last Admin: 06/17/17 21:38 Dose:  650 mg


Albuterol (Ventolin 2.5 Mg/3 Ml Neb.Sol*)  2.5 mg INH Q2H PRN


   PRN Reason: SOB/WHEEZING


Docusate Sodium (Colace Cap*)  200 mg PO DAILY [facility]


   Last Admin: 06/17/17 11:07 Dose:  Not Given


Ferrous Sulfate (Ferrous Sulfate Tab*)  325 mg PO DAILY [facility]


Folic Acid (Folvite Tab*)  1 mg PO DAILY [facility]


   Last Admin: 06/17/17 11:50 Dose:  1 mg


Heparin Sodium (Porcine) (Heparin Flush Picc/Ml/Cvc(*))  0 ml FLUSH 0600,1800 

[facility]


   Last Admin: 06/18/17 05:43 Dose:  2 ml


Ceftriaxone Sodium 1,000 mg/ (Sodium Chloride)  50 mls @ 200 mls/hr IVPB Q24H 

[facility]


   Last Admin: 06/17/17 12:59 Dose:  200 mls/hr


Potassium Chloride/Dextrose (D5w 1/4 Ns 20 Meq Kcl 1000 Ml*)  1,000 mls @ 75 mls

/hr IV PER RATE [facility]


Mometasone Furoate/Formoterol Fumar (Dulera 200/5 Mdi*)  2 puff INH BID [facility]


   Last Admin: 06/18/17 08:34 Dose:  2 puff


Multivitamins/Minerals (Theragran/Minerals Tab*)  1 tab PO DAILY [facility]


   Last Admin: 06/17/17 11:03 Dose:  1 tab


Ondansetron HCl (Zofran Inj*)  4 mg IV Q6H PRN


   PRN Reason: NAUSEA


Pantoprazole Sodium (Protonix Iv*)  40 mg IV BID [facility]


   Last Admin: 06/17/17 21:40 Dose:  40 mg


Polyethylene Glycol/Electrolytes (Miralax*)  17 gm PO DAILY [facility]


   PRN Reason: CONSTIPATION


   Last Admin: 06/17/17 11:07 Dose:  17 gm


Prochlorperazine Edisylate (Compazine Inj*)  10 mg IV Q6H PRN


   PRN Reason: NAUSEA


Thiamine HCl (Vitamin B-1 Tab*)  100 mg PO DAILY [facility]


   Last Admin: 06/17/17 11:03 Dose:  100 mg


Tiotropium Bromide (Spiriva Cap.Inh*)  1 cap INH DAILY [facility]


   Last Admin: 06/18/17 08:34 Dose:  1 cap








 Vital Signs











  06/17/17 06/17/17 06/17/17





  10:00 11:00 11:01


 


Temperature 99.7 F  


 


Pulse Rate 115 124 


 


Respiratory 26 25 23





Rate   


 


Blood Pressure 121/63 143/76 





(mmHg)   


 


O2 Sat by Pulse 95 94 





Oximetry   














  06/17/17 06/17/17 06/17/17





  11:47 11:57 12:00


 


Temperature 101.6 F  101.6 F


 


Pulse Rate  110 111


 


Respiratory  21 25





Rate   


 


Blood Pressure  134/69 127/56





(mmHg)   


 


O2 Sat by Pulse  96 96





Oximetry   














  06/17/17 06/17/17 06/17/17





  13:00 14:00 14:51


 


Temperature   101.9 F


 


Pulse Rate 114 111 


 


Respiratory 19 25 





Rate   


 


Blood Pressure 125/60 116/56 





(mmHg)   


 


O2 Sat by Pulse 96 94 





Oximetry   














  06/17/17 06/17/17 06/17/17





  15:00 15:05 15:07


 


Temperature   


 


Pulse Rate 105 103 104


 


Respiratory 24 27 28





Rate   


 


Blood Pressure  78/42 132/58





(mmHg)   


 


O2 Sat by Pulse 94 91 92





Oximetry   














  06/17/17 06/17/17 06/17/17





  16:00 17:00 17:22


 


Temperature   


 


Pulse Rate 110 112 


 


Respiratory 23 24 28





Rate   


 


Blood Pressure 123/62 140/68 





(mmHg)   


 


O2 Sat by Pulse 96 90 





Oximetry   














  06/17/17 06/17/17 06/17/17





  18:00 19:34 19:40


 


Temperature  99.2 F 


 


Pulse Rate 110 102 


 


Respiratory 18 22 22





Rate   


 


Blood Pressure 147/74 136/48 





(mmHg)   


 


O2 Sat by Pulse 89 84 





Oximetry   














  06/17/17 06/17/17 06/17/17





  19:52 21:00 21:20


 


Temperature  100.5 F 100.5 F


 


Pulse Rate  111 132


 


Respiratory 22 28 29





Rate   


 


Blood Pressure  117/82 117/82





(mmHg)   


 


O2 Sat by Pulse  82 78





Oximetry   














  06/17/17 06/17/17 06/17/17





  23:00 23:07 23:18


 


Temperature  99.0 F 99.1 F


 


Pulse Rate  88 98


 


Respiratory 22 22 20





Rate   


 


Blood Pressure  144/44 114/65





(mmHg)   


 


O2 Sat by Pulse  100 98





Oximetry   














  06/18/17 06/18/17 06/18/17





  01:00 01:09 03:00


 


Temperature  98.9 F 


 


Pulse Rate  96 


 


Respiratory 20 20 20





Rate   


 


Blood Pressure  120/67 





(mmHg)   


 


O2 Sat by Pulse  100 





Oximetry   














  06/18/17 06/18/17 06/18/17





  03:05 03:16 04:16


 


Temperature 98.6 F  


 


Pulse Rate 98  


 


Respiratory 20 20 20





Rate   


 


Blood Pressure 130/73  





(mmHg)   


 


O2 Sat by Pulse 100  





Oximetry   














  06/18/17 06/18/17 06/18/17





  05:00 05:43 06:43


 


Temperature 98.1 F  


 


Pulse Rate 88  


 


Respiratory 24 24 25





Rate   


 


Blood Pressure 122/58  





(mmHg)   


 


O2 Sat by Pulse 99  





Oximetry   














  06/18/17 06/18/17 06/18/17





  07:51 08:00 08:37


 


Temperature 98.5 F  


 


Pulse Rate 98  96


 


Respiratory 25 25 24





Rate   


 


Blood Pressure 124/58  





(mmHg)   


 


O2 Sat by Pulse 100  99





Oximetry   











Oxygen Devices in Use Now: Nasal Cannula


Appearance: Very lethargic, falls asleep when not spoke to, partly up in bed.  

Looks comfortable.


Eyes: No Scleral Icterus


Neck: NL Appearance and Movements; NL JVP, No Thyroid Enlargement, Masses


Respiratory: Symmetrical Chest Expansion and Respiratory Effort, Clear to 

Auscultation, Clear to Percussion


Extremities: No Edema, No Clubbing, Cyanosis, -


Skin: No Rash or Ulcers, No Nodules or Sclerosis, -


Neurological: NL Sensation - Speech dysarthric,  not appropriate.  No tremor.  

He can state his full name but couldn't answer any other questions. 


Result Diagrams: 


 06/18/17 04:33





 06/18/17 04:33


Additional Lab and Data: 


.


Microbiology and Other Data: 


 Microbiology











 06/10/17 22:45 Nasal Screen MRSA (PCR)([facility]) - Final





 Nasal    Mrsa Negative














Assess/Plan/Problems-Billing


.


Assessment: 


86 yo man with p/w melena suspected in setting of upper GI bleed found with 

duodenal ulcer in setting of significant ETOH and NSAID use with stay c/b 

recurrent anemia thought 2/2 blood loss however no e/o continued bleeding as of 

yet all in setting of withdrawal from alcohol











- Patient Problems


(1) Upper GI bleed


Current Visit: Yes   Status: Acute   Code(s): K92.2 - GASTROINTESTINAL 

HEMORRHAGE, UNSPECIFIED   SNOMED Code(s): 48806562


   Comment: Duodenal ulcer found on EGD.  Needs repeat EGD in 2 months.  

Continue BID PPI, IV for now. 


combination of NSAIDS and etoh use at home.


Iron studies equivocal, continue once daily


appreciate GI assistance. No plan on repeat EGD at this point   





(2) Hypernatremia


Current Visit: Yes   Status: Acute   Code(s): E87.0 - HYPEROSMOLALITY AND 

HYPERNATREMIA   SNOMED Code(s): 86028121


   Comment: Na+ 146 6/18.  D5/1/4NS with 20 KCL at 75/hr, watch po intake.     





(3) Alcohol withdrawal delirium


Current Visit: Yes   Status: Acute   Priority: High   Code(s): F10.231 - 

ALCOHOL DEPENDENCE WITH WITHDRAWAL DELIRIUM   SNOMED Code(s): 8531654


   Comment: Stop WAM, may be sedated at present.  Treat with benzo's as needed. 


   





(4) COPD (chronic obstructive pulmonary disease)


Current Visit: Yes   Status: Acute   Code(s): J44.9 - CHRONIC OBSTRUCTIVE 

PULMONARY DISEASE, UNSPECIFIED   SNOMED Code(s): 76683349


   Comment: stable


spiriva daily, albuterol PRN


CPAP

## 2017-06-18 NOTE — RAD
INDICATION: PICC line verification



COMPARISON: Similar chest x-ray June 16, 2017

 

TECHNIQUE: Single AP portable view of the chest was obtained.



FINDINGS: 



Image quality is compromised due to the relative inferiority of a portable chest x-ray.



Again seen is a left-sided PICC line. The tip appears to terminate at the junction of the

left brachiocephalic vein and superior vena cava, retracted slightly from the previous

radiograph.



The heart is normal size.



Faint patchy densities overlying the bilateral lungs are unchanged from the previous

x-ray.



IMPRESSION:  

1. The left-sided PICC line appears to have been retracted slightly with the tip now

terminating at the junction of the left brachiocephalic vein and superior vena cava

relative to the June 16, 2017 chest x-ray.

2. Mild cardiomegaly and patchy densities in the lungs are most consistent with mild

pulmonary edema.

## 2017-06-19 LAB
ANION GAP SERPL CALC-SCNC: 5 MMOL/L (ref 2–11)
BUN SERPL-MCNC: 29 MG/DL (ref 6–24)
BUN/CREAT SERPL: 22.5 (ref 8–20)
CALCIUM SERPL-MCNC: 8 MG/DL (ref 8.6–10.3)
CHLORIDE SERPL-SCNC: 120 MMOL/L (ref 101–111)
GLUCOSE SERPL-MCNC: 120 MG/DL (ref 70–100)
HCO3 SERPL-SCNC: 18 MMOL/L (ref 22–32)
HCT VFR BLD AUTO: 21 % (ref 42–52)
HGB BLD-MCNC: 6.7 G/DL (ref 14–18)
MCH RBC QN AUTO: 29 PG (ref 27–31)
MCHC RBC AUTO-ENTMCNC: 32 G/DL (ref 31–36)
MCV RBC AUTO: 91 FL (ref 80–94)
POTASSIUM SERPL-SCNC: 3.2 MMOL/L (ref 3.5–5)
RBC # BLD AUTO: 2.31 10^6/UL (ref 4–5.4)
SODIUM SERPL-SCNC: 143 MMOL/L (ref 133–145)
WBC # BLD AUTO: 6.1 10^3/UL (ref 3.5–10.8)

## 2017-06-19 PROCEDURE — 30233N1 TRANSFUSION OF NONAUTOLOGOUS RED BLOOD CELLS INTO PERIPHERAL VEIN, PERCUTANEOUS APPROACH: ICD-10-PCS | Performed by: INTERNAL MEDICINE

## 2017-06-19 RX ADMIN — PANTOPRAZOLE SODIUM SCH MG: 40 INJECTION, POWDER, FOR SOLUTION INTRAVENOUS at 22:00

## 2017-06-19 RX ADMIN — PANTOPRAZOLE SODIUM SCH MG: 40 INJECTION, POWDER, FOR SOLUTION INTRAVENOUS at 08:31

## 2017-06-19 RX ADMIN — DEXTROSE MONOHYDRATE, SODIUM CHLORIDE, AND POTASSIUM CHLORIDE SCH MLS/HR: 50; 2.25; 1.49 INJECTION, SOLUTION INTRAVENOUS at 01:48

## 2017-06-19 RX ADMIN — DEXTROSE MONOHYDRATE, SODIUM CHLORIDE, AND POTASSIUM CHLORIDE SCH MLS/HR: 50; 2.25; 1.49 INJECTION, SOLUTION INTRAVENOUS at 16:18

## 2017-06-19 RX ADMIN — FOLIC ACID SCH MG: 1 TABLET ORAL at 08:38

## 2017-06-19 RX ADMIN — Medication SCH MG: at 08:38

## 2017-06-19 RX ADMIN — CHLORDIAZEPOXIDE HYDROCHLORIDE PRN MG: 25 CAPSULE ORAL at 18:33

## 2017-06-19 RX ADMIN — ACETAMINOPHEN PRN MG: 325 TABLET ORAL at 15:58

## 2017-06-19 RX ADMIN — MOMETASONE FUROATE AND FORMOTEROL FUMARATE DIHYDRATE SCH PUFF: 200; 5 AEROSOL RESPIRATORY (INHALATION) at 20:24

## 2017-06-19 RX ADMIN — LORAZEPAM PRN MG: 2 INJECTION INTRAMUSCULAR; INTRAVENOUS at 17:41

## 2017-06-19 RX ADMIN — DEXTROSE MONOHYDRATE, SODIUM CHLORIDE, AND POTASSIUM CHLORIDE SCH MLS/HR: 50; 2.25; 1.49 INJECTION, SOLUTION INTRAVENOUS at 08:31

## 2017-06-19 RX ADMIN — TIOTROPIUM BROMIDE SCH: 18 CAPSULE ORAL; RESPIRATORY (INHALATION) at 08:31

## 2017-06-19 RX ADMIN — Medication SCH ML: at 05:40

## 2017-06-19 RX ADMIN — Medication SCH ML: at 17:32

## 2017-06-19 RX ADMIN — FERROUS SULFATE TAB 325 MG (65 MG ELEMENTAL FE) SCH MG: 325 (65 FE) TAB at 08:38

## 2017-06-19 RX ADMIN — MOMETASONE FUROATE AND FORMOTEROL FUMARATE DIHYDRATE SCH: 200; 5 AEROSOL RESPIRATORY (INHALATION) at 08:30

## 2017-06-19 RX ADMIN — THERA TABS SCH TAB: TAB at 08:38

## 2017-06-19 NOTE — PN
Subjective


Date of Service: 06/19/17


Interval History: 





Sleeping in bed.


Family History: Unchanged from Admission


Social History: Unchanged from Admission


Past Medical History: Unchanged from Admission





Objective


Active Medications: 








Acetaminophen (Tylenol Tab*)  650 mg PO Q6H PRN


   PRN Reason: FEVER/PAIN


   Last Admin: 06/17/17 21:38 Dose:  650 mg


Albuterol (Ventolin 2.5 Mg/3 Ml Neb.Sol*)  2.5 mg INH Q2H PRN


   PRN Reason: SOB/WHEEZING


Chlordiazepoxide (Librium Cap*)  25 mg PO Q4H PRN


   PRN Reason: AGITATION


   Last Admin: 06/18/17 17:50 Dose:  25 mg


Ferrous Sulfate (Ferrous Sulfate Tab*)  325 mg PO DAILY [facility]


Folic Acid (Folvite Tab*)  1 mg PO DAILY [facility]


   Last Admin: 06/18/17 10:33 Dose:  1 mg


Heparin Sodium (Porcine) (Heparin Flush Picc/Ml/Cvc(*))  0 ml FLUSH 0600,1800 

[facility]


   Last Admin: 06/19/17 05:40 Dose:  1 ml


Potassium Chloride/Dextrose (D5w 1/4 Ns 20 Meq Kcl 1000 Ml*)  1,000 mls @ 125 

mls/hr IV PER RATE [facility]


Lorazepam (Ativan Inj*)  1 mg IV PUSH Q6H PRN


   PRN Reason: AGITATION


   Last Admin: 06/18/17 20:51 Dose:  1 mg


Mometasone Furoate/Formoterol Fumar (Dulera 200/5 Mdi*)  2 puff INH BID [facility]


   Last Admin: 06/18/17 21:31 Dose:  2 puff


Multivitamins/Minerals (Theragran/Minerals Tab*)  1 tab PO DAILY [facility]


   Last Admin: 06/18/17 10:36 Dose:  1 tab


Ondansetron HCl (Zofran Inj*)  4 mg IV Q6H PRN


   PRN Reason: NAUSEA


Pantoprazole Sodium (Protonix Iv*)  40 mg IV BID [facility]


   Last Admin: 06/18/17 20:51 Dose:  40 mg


Polyethylene Glycol/Electrolytes (Miralax*)  17 gm PO DAILY [facility]


   PRN Reason: CONSTIPATION


   Last Admin: 06/17/17 11:07 Dose:  17 gm


Prochlorperazine Edisylate (Compazine Inj*)  10 mg IV Q6H PRN


   PRN Reason: NAUSEA


Thiamine HCl (Vitamin B-1 Tab*)  100 mg PO DAILY [facility]


   Last Admin: 06/18/17 10:32 Dose:  100 mg


Tiotropium Bromide (Spiriva Cap.Inh*)  1 cap INH DAILY [facility]


   Last Admin: 06/18/17 08:34 Dose:  1 cap








 Vital Signs











  06/18/17 06/18/17 06/18/17





  07:51 08:00 08:37


 


Temperature 98.5 F  


 


Pulse Rate 98  96


 


Respiratory 25 25 24





Rate   


 


Blood Pressure 124/58  





(mmHg)   


 


O2 Sat by Pulse 100  99





Oximetry   














  06/18/17 06/18/17 06/18/17





  09:10 09:58 11:59


 


Temperature 100.1 F 98.8 F 


 


Pulse Rate 99  95


 


Respiratory 18  16





Rate   


 


Blood Pressure 139/67  113/63





(mmHg)   


 


O2 Sat by Pulse 100 97 100





Oximetry   














  06/18/17 06/18/17 06/18/17





  14:04 15:50 17:50


 


Temperature  98.1 F 


 


Pulse Rate  111 


 


Respiratory  18 26





Rate   


 


Blood Pressure  115/59 





(mmHg)   


 


O2 Sat by Pulse 97 96 





Oximetry   














  06/18/17 06/18/17 06/18/17





  19:50 20:00 20:14


 


Temperature   97.5 F


 


Pulse Rate   61


 


Respiratory 24 24 24





Rate   


 


Blood Pressure   111/62





(mmHg)   


 


O2 Sat by Pulse   90





Oximetry   














  06/18/17 06/18/17 06/18/17





  20:21 20:51 21:32


 


Temperature 98.2 F  


 


Pulse Rate   112


 


Respiratory  24 20





Rate   


 


Blood Pressure   





(mmHg)   


 


O2 Sat by Pulse   95





Oximetry   














  06/18/17 06/19/17 06/19/17





  21:51 00:07 00:46


 


Temperature   


 


Pulse Rate  101 


 


Respiratory 22 24 





Rate   


 


Blood Pressure  115/62 





(mmHg)   


 


O2 Sat by Pulse  98 95





Oximetry   














  06/19/17





  03:40


 


Temperature 97.7 F


 


Pulse Rate 96


 


Respiratory 16





Rate 


 


Blood Pressure 101/56





(mmHg) 


 


O2 Sat by Pulse 97





Oximetry 











Oxygen Devices in Use Now: Nasal Cannula


Appearance: Supine in bed, asleep.  Looks comfortable.  Occ mild cough.


Eyes: No Scleral Icterus


Neck: NL Appearance and Movements; NL JVP, No Thyroid Enlargement, Masses


Respiratory: Symmetrical Chest Expansion and Respiratory Effort, Clear to 

Auscultation, Clear to Percussion


Cardiovascular: NL Sounds; No Murmurs; No JVD, RRR, No Edema, -


Extremities: No Edema, No Clubbing, Cyanosis, -


Skin: No Rash or Ulcers, No Nodules or Sclerosis, -


Neurological: - - fasiculations upper chest.  No tremor. 


Result Diagrams: 


 06/19/17 05:05





 06/19/17 05:05


Additional Lab and Data: 


.


Microbiology and Other Data: 


 Microbiology











 06/10/17 22:45 Nasal Screen MRSA (PCR)([facility]) - Final





 Nasal    Mrsa Negative














Assess/Plan/Problems-Billing


.


Assessment: 


88 yo man with p/w melena suspected in setting of upper GI bleed found with 

duodenal ulcer in setting of significant ETOH and NSAID use with stay c/b 

recurrent anemia thought 2/2 blood loss however no e/o continued bleeding as of 

yet all in setting of withdrawal from alcohol











- Patient Problems


(1) Upper GI bleed


Current Visit: Yes   Status: Acute   Code(s): K92.2 - GASTROINTESTINAL 

HEMORRHAGE, UNSPECIFIED   SNOMED Code(s): 26296536


   Comment: Duodenal ulcer found on EGD.  Needs repeat EGD in 2 months.  

Continue BID PPI, IV fluids for now. 


combination of NSAIDS and etoh use at home.


Iron studies equivocal, continue FeSO4 once daily


2 U PRBC ordered 6/19/17. CBC, INR 6/20.   





(2) Hypernatremia


Current Visit: Yes   Status: Acute   Code(s): E87.0 - HYPEROSMOLALITY AND 

HYPERNATREMIA   SNOMED Code(s): 33554356


   Comment: Na+ 143 6/19.  D5/1/4NS with 20 KCL at 125/hr, watch po intake.  

BMP 6/20.   





(3) Alcohol withdrawal delirium


Current Visit: Yes   Status: Acute   Priority: High   Code(s): F10.231 - 

ALCOHOL DEPENDENCE WITH WITHDRAWAL DELIRIUM   SNOMED Code(s): 6067406


   Comment: Stop WAM, may be sedated at present.  Treat with benzo's as needed. 


   





(4) COPD (chronic obstructive pulmonary disease)


Current Visit: Yes   Status: Acute   Code(s): J44.9 - CHRONIC OBSTRUCTIVE 

PULMONARY DISEASE, UNSPECIFIED   SNOMED Code(s): 50629404


   Comment: stable


spiriva daily, albuterol PRN


CPAP


   





(5) Altered mental status


Current Visit: Yes   Status: Acute   Code(s): R41.82 - ALTERED MENTAL STATUS, 

UNSPECIFIED   SNOMED Code(s): 485938235


   Comment: CT brain ordered 6/19.

## 2017-06-19 NOTE — RAD
Indication: Fever.



Single frontal view of the chest performed at 0910 hours was reviewed.



Comparison is made with previous exam dated June 18, 2017.



Central line appears to be in the innominate vein. Bibasilar atelectasis is likely

present. There may be some mild vascular congestion.



IMPRESSION: CENTRAL CATHETER IN THE INNOMINATE VEIN WITH BIBASILAR ATELECTASIS.

## 2017-06-19 NOTE — RAD
INDICATION: Right wrist pain     



COMPARISON: None



TECHNIQUE: AP, lateral, and oblique views were obtained.



FINDINGS: There is osteopenia. There is advanced osteoarthritis about the radiocarpal

joint. There is a SLAC wrist deformity. There is deformity of the proximal carpal row.

There is advanced osteoarthritis but the first MCP joint. There is diffuse soft tissue

swelling with soft tissue calcifications about the distal radial ulnar joint.



 

IMPRESSION: ADVANCED OSTEOARTHRITIC CHANGES. DIFFUSE SOFT TISSUE SWELLING.

## 2017-06-19 NOTE — RAD
Indication: Altered mental status. Combative. 



Comparison: No relevant prior exams available on the [facility] PACS for comparison.



Technique: Noncontrast CT vertex of skull through foramen magnum.



Report: Moderate prominence of the cerebral sulci and cerebellar fissures reflecting

atrophy. Negative for gray matter white matter obscuration, intra or extra-axial

hemorrhage, or mass effect. No suspicious calvarial or skull base lesion evident. Clear

visualized paranasal sinuses and mastoid air spaces. Suggestion of previous resection of

the medial walls of the maxillary sinuses. Arthropathic change at the atlantoaxial

articulation with calcification at the level of the transverse ligament. Negative for

scalp hematoma.



IMPRESSION: 

1. Involutional change.

2. No acute intracranial process evident.

## 2017-06-20 LAB
ANION GAP SERPL CALC-SCNC: 5 MMOL/L (ref 2–11)
BUN SERPL-MCNC: 22 MG/DL (ref 6–24)
BUN/CREAT SERPL: 21.8 (ref 8–20)
CALCIUM SERPL-MCNC: 8 MG/DL (ref 8.6–10.3)
CHLORIDE SERPL-SCNC: 114 MMOL/L (ref 101–111)
GLUCOSE SERPL-MCNC: 105 MG/DL (ref 70–100)
HCO3 SERPL-SCNC: 19 MMOL/L (ref 22–32)
HCT VFR BLD AUTO: 27 % (ref 42–52)
HGB BLD-MCNC: 9.2 G/DL (ref 14–18)
MCH RBC QN AUTO: 30 PG (ref 27–31)
MCHC RBC AUTO-ENTMCNC: 34 G/DL (ref 31–36)
MCV RBC AUTO: 88 FL (ref 80–94)
POTASSIUM SERPL-SCNC: 3.7 MMOL/L (ref 3.5–5)
RBC # BLD AUTO: 3.1 10^6/UL (ref 4–5.4)
SODIUM SERPL-SCNC: 138 MMOL/L (ref 133–145)
WBC # BLD AUTO: 6.6 10^3/UL (ref 3.5–10.8)

## 2017-06-20 RX ADMIN — FOLIC ACID SCH MG: 1 TABLET ORAL at 08:44

## 2017-06-20 RX ADMIN — Medication SCH ML: at 06:32

## 2017-06-20 RX ADMIN — PANTOPRAZOLE SODIUM SCH MG: 40 INJECTION, POWDER, FOR SOLUTION INTRAVENOUS at 08:44

## 2017-06-20 RX ADMIN — THERA TABS SCH TAB: TAB at 08:43

## 2017-06-20 RX ADMIN — MOMETASONE FUROATE AND FORMOTEROL FUMARATE DIHYDRATE SCH PUFF: 200; 5 AEROSOL RESPIRATORY (INHALATION) at 09:13

## 2017-06-20 RX ADMIN — Medication SCH ML: at 11:12

## 2017-06-20 RX ADMIN — Medication SCH MG: at 08:43

## 2017-06-20 RX ADMIN — DEXTROSE MONOHYDRATE, SODIUM CHLORIDE, AND POTASSIUM CHLORIDE SCH MLS/HR: 50; 2.25; 1.49 INJECTION, SOLUTION INTRAVENOUS at 02:47

## 2017-06-20 RX ADMIN — Medication SCH ML: at 08:45

## 2017-06-20 RX ADMIN — TIOTROPIUM BROMIDE SCH CAP: 18 CAPSULE ORAL; RESPIRATORY (INHALATION) at 09:12

## 2017-06-20 RX ADMIN — OMEPRAZOLE SCH: 20 CAPSULE, DELAYED RELEASE ORAL at 09:07

## 2017-06-20 RX ADMIN — MOMETASONE FUROATE AND FORMOTEROL FUMARATE DIHYDRATE SCH PUFF: 200; 5 AEROSOL RESPIRATORY (INHALATION) at 22:15

## 2017-06-20 RX ADMIN — OMEPRAZOLE SCH MG: 20 CAPSULE, DELAYED RELEASE ORAL at 17:16

## 2017-06-20 RX ADMIN — FERROUS SULFATE TAB 325 MG (65 MG ELEMENTAL FE) SCH MG: 325 (65 FE) TAB at 08:43

## 2017-06-20 RX ADMIN — Medication SCH ML: at 17:16

## 2017-06-20 NOTE — DCNOTE
Subjective


Date of Service: 06/20/17


Interval History: 





He offers no c/o.  He denies pain. 


Family History: Unchanged from Admission


Social History: Unchanged from Admission


Past Medical History: Unchanged from Admission





Objective


Active Medications: 








Acetaminophen (Tylenol Tab*)  650 mg PO Q4H PRN


   PRN Reason: PAIN


   Last Admin: 06/19/17 15:58 Dose:  650 mg


Albuterol (Ventolin 2.5 Mg/3 Ml Neb.Sol*)  2.5 mg INH Q2H PRN


   PRN Reason: SOB/WHEEZING


Chlordiazepoxide (Librium Cap*)  25 mg PO Q4H PRN


   PRN Reason: AGITATION


   Last Admin: 06/19/17 18:33 Dose:  25 mg


Ferrous Sulfate (Ferrous Sulfate Tab*)  325 mg PO DAILY [facility]


   Last Admin: 06/19/17 08:38 Dose:  325 mg


Folic Acid (Folvite Tab*)  1 mg PO DAILY [facility]


   Last Admin: 06/19/17 08:38 Dose:  1 mg


Heparin Sodium (Porcine) (Heparin Flush Picc/Ml/Cvc(*))  0 ml FLUSH 0600,1800 

[facility]


   Last Admin: 06/20/17 06:32 Dose:  1 ml


Potassium Chloride/Dextrose (D5w 1/4 Ns 20 Meq Kcl 1000 Ml*)  1,000 mls @ 125 

mls/hr IV PER RATE [facility]


   Last Admin: 06/20/17 02:47 Dose:  125 mls/hr


Lorazepam (Ativan Inj*)  1 mg IV PUSH Q6H PRN


   PRN Reason: AGITATION


   Last Admin: 06/19/17 17:41 Dose:  1 mg


Mometasone Furoate/Formoterol Fumar (Dulera 200/5 Mdi*)  2 puff INH BID [facility]


   Last Admin: 06/19/17 20:24 Dose:  2 puff


Multivitamins/Minerals (Theragran/Minerals Tab*)  1 tab PO DAILY [facility]


   Last Admin: 06/19/17 08:38 Dose:  1 tab


Ondansetron HCl (Zofran Inj*)  4 mg IV Q6H PRN


   PRN Reason: NAUSEA


Pantoprazole Sodium (Protonix Iv*)  40 mg IV BID [facility]


   Last Admin: 06/19/17 22:00 Dose:  40 mg


Polyethylene Glycol/Electrolytes (Miralax*)  17 gm PO DAILY PRN


   PRN Reason: CONSTIPATION


   Last Admin: 06/17/17 11:07 Dose:  17 gm


Prochlorperazine Edisylate (Compazine Inj*)  10 mg IV Q6H PRN


   PRN Reason: NAUSEA


Thiamine HCl (Vitamin B-1 Tab*)  100 mg PO DAILY [facility]


   Last Admin: 06/19/17 08:38 Dose:  100 mg


Tiotropium Bromide (Spiriva Cap.Inh*)  1 cap INH DAILY [facility]


   Last Admin: 06/19/17 08:31 Dose:  Not Given








 Vital Signs











  06/19/17 06/19/17 06/19/17





  08:58 09:00 11:59


 


Temperature  98.1 F 98.2 F


 


Pulse Rate 90  86


 


Respiratory 20 16 20





Rate   


 


Blood Pressure   108/46





(mmHg)   


 


O2 Sat by Pulse 97  96





Oximetry   














  06/19/17 06/19/17 06/19/17





  12:01 12:15 16:33


 


Temperature 98.2 F 98.4 F 98.2 F


 


Pulse Rate 86 84 85


 


Respiratory 20 18 18





Rate   


 


Blood Pressure 108/46 120/46 113/46





(mmHg)   


 


O2 Sat by Pulse 96 99 92





Oximetry   














  06/19/17 06/19/17 06/19/17





  17:41 18:33 20:00


 


Temperature   


 


Pulse Rate   


 


Respiratory 22 20 16





Rate   


 


Blood Pressure   





(mmHg)   


 


O2 Sat by Pulse   





Oximetry   














  06/19/17 06/19/17





  20:26 23:24


 


Temperature  


 


Pulse Rate 82 


 


Respiratory 20 20





Rate  


 


Blood Pressure  





(mmHg)  


 


O2 Sat by Pulse 93 





Oximetry  











Oxygen Devices in Use Now: None


Appearance: Alert, in a chair, enjoying his breakfast.  Sociable.  Looks 

comfortable.


Eyes: No Scleral Icterus


Respiratory: Symmetrical Chest Expansion and Respiratory Effort, Clear to 

Auscultation, Clear to Percussion


Cardiovascular: NL Sounds; No Murmurs; No JVD, RRR, No Edema, -


Extremities: No Edema, No Clubbing, Cyanosis, -


Skin: No Rash or Ulcers, No Nodules or Sclerosis, -


Neurological: NL Sensation, - - Diminished hearing.  Sociable.  Knows his full 

name, his age, but gave "August" for present month.


Result Diagrams: 


 06/20/17 05:30





 06/20/17 05:30


Additional Lab and Data: 


.


Microbiology and Other Data: 


 Microbiology











 06/10/17 22:45 Nasal Screen MRSA (PCR)(FRANCISCO) - Final





 Nasal    Mrsa Negative














Assess/Plan/Problems-Billing


.


Assessment: 


86 yo man with p/w melena suspected in setting of upper GI bleed found with 

duodenal ulcer in setting of significant ETOH and NSAID use with stay c/b 

recurrent anemia thought 2/2 blood loss however no e/o continued bleeding as of 

yet all in setting of withdrawal from alcohol











- Patient Problems


(1) Upper GI bleed


Current Visit: Yes   Status: Acute   Code(s): K92.2 - GASTROINTESTINAL 

HEMORRHAGE, UNSPECIFIED   SNOMED Code(s): 96022247


   Comment: Duodenal ulcer found on EGD.  Needs repeat EGD in 2 months.  

Continue BID PPI for 6 more weeks then daily.  


combination of NSAIDS and etoh use at home.


Iron studies equivocal, continue FeSO4 once daily


2 U PRBC ordered 6/19/17, total 11 U this admission. Hgb 9.2 on 6/20.  CBC 1 

week. 


INR wnl on 6/20.   





(2) Hypernatremia


Current Visit: Yes   Status: Acute   Code(s): E87.0 - HYPEROSMOLALITY AND 

HYPERNATREMIA   SNOMED Code(s): 81900221


   Comment: Na+ 138 6/20.  Oral intake 1130 6/19.  Repeat BMP 1 week.    





(3) Alcohol withdrawal delirium


Current Visit: Yes   Status: Acute   Priority: High   Code(s): F10.231 - 

ALCOHOL DEPENDENCE WITH WITHDRAWAL DELIRIUM   SNOMED Code(s): 6385401


   Comment: Mental status improved.  Received lorazepam 1 mg IV once on 6/19, 

would use haloperidol in SNF for agitation.  


   





(4) COPD (chronic obstructive pulmonary disease)


Current Visit: Yes   Status: Acute   Code(s): J44.9 - CHRONIC OBSTRUCTIVE 

PULMONARY DISEASE, UNSPECIFIED   SNOMED Code(s): 04249881


   Comment: stable


spiriva daily, albuterol PRN


CPAP


   





(5) Altered mental status


Current Visit: Yes   Status: Acute   Code(s): R41.82 - ALTERED MENTAL STATUS, 

UNSPECIFIED   SNOMED Code(s): 971435524


   Comment: CT brain no acute changes on 6/19.   


Status and Disposition: 





Discharge to [facility] now.

## 2017-06-21 VITALS — SYSTOLIC BLOOD PRESSURE: 136 MMHG | DIASTOLIC BLOOD PRESSURE: 59 MMHG

## 2017-06-21 RX ADMIN — MOMETASONE FUROATE AND FORMOTEROL FUMARATE DIHYDRATE SCH: 200; 5 AEROSOL RESPIRATORY (INHALATION) at 07:18

## 2017-06-21 RX ADMIN — FOLIC ACID SCH MG: 1 TABLET ORAL at 07:49

## 2017-06-21 RX ADMIN — POLYETHYLENE GLYCOL 3350 PRN GM: 17 POWDER, FOR SOLUTION ORAL at 07:47

## 2017-06-21 RX ADMIN — TIOTROPIUM BROMIDE SCH: 18 CAPSULE ORAL; RESPIRATORY (INHALATION) at 07:18

## 2017-06-21 RX ADMIN — THERA TABS SCH TAB: TAB at 07:49

## 2017-06-21 RX ADMIN — OMEPRAZOLE SCH MG: 20 CAPSULE, DELAYED RELEASE ORAL at 07:48

## 2017-06-21 RX ADMIN — Medication SCH ML: at 05:50

## 2017-06-21 RX ADMIN — FERROUS SULFATE TAB 325 MG (65 MG ELEMENTAL FE) SCH MG: 325 (65 FE) TAB at 07:48

## 2017-06-21 RX ADMIN — ACETAMINOPHEN PRN MG: 325 TABLET ORAL at 07:49

## 2017-06-21 RX ADMIN — Medication SCH MG: at 07:48

## 2017-06-21 NOTE — TRS
CC:  Dr. Rosenthal; [facility] *

 

TRANSFER SUMMARY:

 

DATE OF ADMISSION:

 

DATE OF TRANSFER:  06/20/17

 

HISTORY:  This 87-year-old man presented with history of melena and dizziness.  
He had melena for 2 to 3 days before admission, although his wife said this may 
have been occurring for weeks.  The patient himself is quite a poor historian.  
He has been taking diclofenac and ibuprofen at home as well as drinking 2 to 4 
beers plus 1 to 2 shots of vodka every day.  This may be an underestimate of 
what he actually drank.

 

The patient was felt to have an upper GI hemorrhage.  He was initially admitted 
to telemetry.  He became unstable and was sent to the ICU.  He received a total 
of 11 units of packed red blood cells during this admission.  He underwent 
endoscopy on 06/11/17 by Dr. Jeffrey.  He had duodenal ulcer, which was not 
bleeding at the time of endoscopy.

 

The patient had rather severe alcohol withdrawal syndrome.  I think this may 
have largely if not completely resolved by the day of discharge.  I am not sure 
what his baseline is and will be in the future.  There may be some underlying 
degree of chronic encephalopathy.  I would treat his any agitation that he has 
at this point with low-dose haloperidol rather than benzodiazepines as I think 
by this time the problem is more of his chronic encephalopathy than acute 
alcohol withdrawal syndrome, however, it is hard to be certain of this.

 

He did have hyponatremia, which resolved.  His oral intake has markedly 
improved the last couple of days in the hospital.

 

FINAL DIAGNOSES:

1.  Upper gastrointestinal bleed.

2.  Hyponatremia.

3.  Alcohol withdrawal.

4.  Chronic obstructive pulmonary disease.

5.  Altered mental status, possible chronic encephalopathy.

 

DISCHARGE MEDICATIONS:

1.  Acetaminophen 650 mg every 4 hours p.r.n.

2.  Albuterol 2.5 mg by nebulizer every 2 hours p.r.n.

3.  Ferrous sulfate 325 mg daily.

4.  Folic acid 1 mg daily.

5.  Haloperidol 0.5 mg every 4 hours p.r.n.

6.  Mometasone/formoterol 200/5 two puffs b.i.d.

7.  Multivitamin with minerals daily.

8.  Omeprazole 20 mg b.i.d. for 6 weeks, then once daily.

9.  Polyethylene glycol 17 g daily p.r.n.

10.  Thiamine 100 mg daily.

11.  Tiotropium 1 capsule daily.

 

I suggest the patient have CBC and basic metabolic profile in 1 week.

 

DISPOSITION:  The patient is transferred to [facility].

 

 [id number]/[facility] #: [id number]

MTDD

## 2017-06-21 NOTE — RAD
Indication: Right wrist injury



3 views of the right wrist demonstrates degenerative changes of the radiocarpal joint.

There is tear of the scapholunate ligament with widening of the scapholunate interval.

This is consistent with scapholunate advanced collapse. There is ulnar positive variance

noted. Chondrocalcinosis is noted. Marked degenerative changes of the first

metacarpophalangeal joint is noted.



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

scapholunate interval with scapholunate advanced collapsed. No fracture is noted.

## 2017-08-26 ENCOUNTER — HOSPITAL ENCOUNTER (EMERGENCY)
Dept: HOSPITAL 25 - ED | Age: 82
LOS: 1 days | Discharge: HOME | End: 2017-08-27
Payer: MEDICARE

## 2017-08-26 DIAGNOSIS — Z87.891: ICD-10-CM

## 2017-08-26 DIAGNOSIS — Z88.0: ICD-10-CM
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
## 2017-08-26 DIAGNOSIS — S09.90XA: Primary | ICD-10-CM

## 2017-08-26 DIAGNOSIS — I10: ICD-10-CM
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
## 2017-08-26 PROCEDURE — 99283 EMERGENCY DEPT VISIT LOW MDM: CPT

## 2017-08-26 PROCEDURE — 72170 X-RAY EXAM OF PELVIS: CPT

## 2017-08-26 PROCEDURE — 70450 CT HEAD/BRAIN W/O DYE: CPT

## 2017-08-26 NOTE — ED
Jad GARCIA Rebecca, scribed for Fabrizio Hickman MD on 08/26/17 at 2232 .





Head Injury





- HPI Summary


HPI Summary: 


Pt is an 89 y/o M BIBA who presents to ED c/o head pain and swelling on the R 

temporal region s/p fall. Pt reports that at about 2100 he fell backwards down 

approximately 5-6 carpeted steps after stepping back. Negative LOC. Associated 

head pain is currently mild, ranked 2/10. Sx aggravated and alleviated by 

nothing. Additionally notes chest pain immediately after the fall which are now 

completely resolved as well as a skin tear to the right upper arm. Is not on 

any blood thinners. 





- History Of Current Complaint


Chief Complaint: EDHeadInjury


Stated Complaint: FALL


Time Seen by Provider: 08/26/17 22:18


Hx Obtained From: Patient


Mechanism Of Injury: Fall From Height Of: - Down 5-6 stairs


Onset/Duration: Traumatic, Still Present


Onset of Pain: Prior to Arrival


Severity Initially: Mild


Pain Intensity: 2


Pain Scale Used: 0-10 Numeric


Location of Head Injury: Temporal - Right


Aggravating Factor(s): Other: - Nothing


Alleviating Factor(s): Other: - Nothing


Associated Signs And Symptoms: Swelling, Other: - CP immediately after episode 

- resolved





- Allergies/Home Medications


Allergies/Adverse Reactions: 


 Allergies











Allergy/AdvReac Type Severity Reaction Status Date / Time


 


Penicillins [PCN] Allergy  Itching Verified 08/26/17 22:14














PMH/Surg Hx/FS Hx/Imm Hx


Endocrine/Hematology History: 


   Denies: Hx Diabetes


Cardiovascular History: Reports: Hx Hypertension


Respiratory History: Reports: Hx Chronic Obstructive Pulmonary Disease (COPD)


   Denies: Hx Asthma


 History: 


   Denies: Hx Dialysis


Sensory History: Reports: Hx Contacts or Glasses, Hx Deafness, Hx Hearing Aid


Opthamlomology History: Reports: Hx Contacts or Glasses


Neurological History: 


   Denies: Hx Dementia, Hx Seizures





- Cancer History


Cancer Type, Location and Year: Denies Hx CA





- Surgical History


Surgery Procedure, Year, and Place: bilateral ankles - years ago


Infectious Disease History: No


Infectious Disease History: 


   Denies: Traveled Outside the [place] in Last 30 Days





- Family History


Known Family History: Positive: Other - Spinal tumor





- Social History


Alcohol Use: Daily


Substance Use Type: Reports: None


Smoking Status (MU): Former Smoker





Review of Systems


Negative: Fever


Positive: Chest Pain - immediately after fall - resolved


Positive: Other - Head pain s/p fall


Positive: Other - Swlling in the R temporal region s/p fall, skin tear to the 

right upper arm


All Other Systems Reviewed And Are Negative: Yes





Physical Exam


Triage Information Reviewed: Yes


Vital Signs On Initial Exam: 


 Initial Vitals











Temp Pulse Resp BP Pulse Ox


 


 98.2 F   78   18   133/71   97 


 


 08/26/17 22:10  08/26/17 22:10  08/26/17 22:10  08/26/17 22:10  08/26/17 22:10











Vital Signs Reviewed: Yes


Appearance: Positive: Well-Appearing, No Pain Distress


Skin: Positive: Warm


Head/Face: Positive: Normal Head/Face Inspection


Eyes: Positive: TALISHA


ENT: Positive: Hearing grossly normal


Neck: Positive: Supple


Respiratory/Lung Sounds: Positive: Breath Sounds Present


Cardiovascular: Positive: RRR


Abdomen Description: Positive: Nontender, Soft


Musculoskeletal: Positive: Other - skin tear lt elbow


Neurological: Positive: Alert, Oriented to Person Place, Time


Psychiatric: Positive: Affect/Mood Appropriate





- Bebeto Coma Scale


Coma Scale Total: 15





Diagnostics





- Vital Signs


 Vital Signs











  Temp Pulse Resp BP Pulse Ox


 


 08/26/17 22:10  98.2 F  78  18  133/71  97














- Laboratory


Lab Statement: Any lab studies that have been ordered have been reviewed, and 

results considered in the medical decision making process.





- Radiology


  ** Pelvis XR


Xray Interpretation: No Acute Changes


Radiology Interpretation Completed By: ED Physician





- CT


  ** CT Brain


CT Interpretation: No Acute Changes - Involutional changes. No hemorrhage. no 

mass. No visible infarct. The osseous structures are intact. Right frontal 

scalp hematoma.  ED physician reviewed this radiology report and agrees.


CT Interpretation Completed By: Radiologist





Re-Evaluation





- Re-Evaluation


  ** First Eval


Re-Evaluation Time: 23:37


Change: Improved


Comment: Evaluated skin tear and discussed CT results and D/C plan.





Head Injury Course/Dx


Assessment/Plan: Pt is an 89 y/o M BIBA who presents to ED c/o head pain and 

swelling on the R temporal region s/p fall. Pt reports that at about 2100 he 

fell backwards down approximately 5-6 carpeted steps after stepping back. 

Negative LOC. Associated head pain is currently mild, ranked 2/10. Sx 

aggravated and alleviated by nothing. Additionally notes chest pain immediately 

after the fall which are now completely resolved. Is not on any blood thinners. 

Brain CT reveals "Involutional changes. No hemorrhage. no mass. No visible 

infarct. The osseous structures are intact. Right frontal scalp hematoma." 

Pelvic XR reveals no acute findings. In the ED course, the pt was administered 

650 mg Tylenol PO. He will be D/C to home with Dx of closed head injury and a 

follow up with his PCP. He understands and agrees.  Elevated BP noted and 

advised to f/u with PCP.





- Diagnoses


Provider Diagnoses: 


 Closed head injury








Discharge





- Discharge Plan


Condition: Stable


Disposition: HOME


Patient Education Materials:  Head Injury (ED)


Referrals: 


Philip Rosenthal MD [Primary Care Provider] - 3 Days


Additional Instructions: 


Return to the ED for any returning or worsening symptoms. 





The documentation as recorded by the Jad lai Rebecca accurately 

reflects the service I personally performed and the decisions made by me, 

Fabrizio Hickman MD.

## 2017-08-27 VITALS — DIASTOLIC BLOOD PRESSURE: 79 MMHG | SYSTOLIC BLOOD PRESSURE: 122 MMHG

## 2017-08-27 NOTE — RAD
HISTORY: Fall. No other history is provided



COMPARISONS: None



VIEWS: 1, Single frontal view of the pelvis    



FINDINGS:



BONE DENSITY: Normal.

BONES: There is no displaced fracture.    

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

osteoporosis of the SI joints bilaterally.   

ALIGNMENT: There is no dislocation. 

SOFT TISSUES: Unremarkable.



OTHER FINDINGS: Degenerative changes are noted of the spine



IMPRESSION: 

NO ACUTE OSSEOUS INJURY. SINGLE FRONTAL VIEW OF THE PELVIS IS AN INCOMPLETE EVALUATION FOR

HIP FRACTURE. IF SYMPTOMS PERSIST, RECOMMEND REPEAT IMAGING.

## 2017-08-27 NOTE — RAD
HISTORY: Fall, head trauma



COMPARISONS: June 19, 2017



TECHNIQUE: Multiple contiguous axial CT scans were obtained of the head without 

intravenous contrast. 



FINDINGS: 

HEMORRHAGE/INFARCT: There is no hemorrhage or acute infarct.

MASSES/SHIFT: There is no mass or shift.



EXTRA-AXIAL SPACES: There are no extra-axial fluid collections.

SULCI AND VENTRICLES: The sulci and ventricles are normal in size and position for the

patient's stated age.



CEREBRUM: There are no focal parenchymal abnormalities.

BRAINSTEM: There are no focal parenchymal abnormalities.

CEREBELLUM: There are no focal parenchymal abnormalities.



VESSELS: The vessels are grossly normal.

PARANASAL SINUSES: The paranasal sinuses are clear.

ORBITS: The orbits are unremarkable.

BONES AND SOFT TISSUE: There is soft tissue swelling of the right frontal scalp. There is

no depressed or displaced skull fracture.



OTHER: None



IMPRESSION: 

NO ACUTE INTRACRANIAL PATHOLOGY.

## 2019-02-24 ENCOUNTER — HOSPITAL ENCOUNTER (INPATIENT)
Dept: HOSPITAL 25 - ED | Age: 84
LOS: 12 days | Discharge: NURSING, REHAB OR LONG-TERM CARE | DRG: 896 | End: 2019-03-08
Attending: INTERNAL MEDICINE | Admitting: INTERNAL MEDICINE
Payer: MEDICARE

## 2019-02-24 DIAGNOSIS — K21.9: ICD-10-CM

## 2019-02-24 DIAGNOSIS — Z88.0: ICD-10-CM

## 2019-02-24 DIAGNOSIS — S51.011A: ICD-10-CM

## 2019-02-24 DIAGNOSIS — M19.021: ICD-10-CM

## 2019-02-24 DIAGNOSIS — M19.022: ICD-10-CM

## 2019-02-24 DIAGNOSIS — R00.0: ICD-10-CM

## 2019-02-24 DIAGNOSIS — J44.9: ICD-10-CM

## 2019-02-24 DIAGNOSIS — M85.842: ICD-10-CM

## 2019-02-24 DIAGNOSIS — S80.01XA: ICD-10-CM

## 2019-02-24 DIAGNOSIS — M85.841: ICD-10-CM

## 2019-02-24 DIAGNOSIS — N18.3: ICD-10-CM

## 2019-02-24 DIAGNOSIS — F10.231: Primary | ICD-10-CM

## 2019-02-24 DIAGNOSIS — I44.0: ICD-10-CM

## 2019-02-24 DIAGNOSIS — J06.9: ICD-10-CM

## 2019-02-24 DIAGNOSIS — Z91.81: ICD-10-CM

## 2019-02-24 DIAGNOSIS — S06.5X0A: ICD-10-CM

## 2019-02-24 DIAGNOSIS — J98.11: ICD-10-CM

## 2019-02-24 DIAGNOSIS — D64.9: ICD-10-CM

## 2019-02-24 DIAGNOSIS — S51.012A: ICD-10-CM

## 2019-02-24 DIAGNOSIS — G31.9: ICD-10-CM

## 2019-02-24 DIAGNOSIS — I49.1: ICD-10-CM
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
## 2019-02-24 DIAGNOSIS — I12.9: ICD-10-CM

## 2019-02-24 DIAGNOSIS — S05.41XA: ICD-10-CM

## 2019-02-24 DIAGNOSIS — F03.90: ICD-10-CM

## 2019-02-24 DIAGNOSIS — Z87.891: ICD-10-CM

## 2019-02-24 DIAGNOSIS — M10.9: ICD-10-CM
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
## 2019-02-24 LAB
ALBUMIN SERPL BCG-MCNC: 4.2 G/DL (ref 3.2–5.2)
ALBUMIN/GLOB SERPL: 1.4 {RATIO} (ref 1–3)
ALP SERPL-CCNC: 57 U/L (ref 34–104)
ALT SERPL W P-5'-P-CCNC: 11 U/L (ref 7–52)
ANION GAP SERPL CALC-SCNC: 10 MMOL/L (ref 2–11)
AST SERPL-CCNC: 17 U/L (ref 13–39)
BASOPHILS # BLD AUTO: 0 10^3/UL (ref 0–0.2)
BUN SERPL-MCNC: 27 MG/DL (ref 6–24)
BUN/CREAT SERPL: 22.5 (ref 8–20)
CALCIUM SERPL-MCNC: 9.4 MG/DL (ref 8.6–10.3)
CHLORIDE SERPL-SCNC: 105 MMOL/L (ref 101–111)
CK SERPL-CCNC: 58 U/L (ref 10–223)
EOSINOPHIL # BLD AUTO: 0.2 10^3/UL (ref 0–0.6)
GLOBULIN SER CALC-MCNC: 2.9 G/DL (ref 2–4)
GLUCOSE SERPL-MCNC: 94 MG/DL (ref 70–100)
HCO3 SERPL-SCNC: 20 MMOL/L (ref 22–32)
HCT VFR BLD AUTO: 41 % (ref 42–52)
HGB BLD-MCNC: 13.4 G/DL (ref 14–18)
INR PPP/BLD: 0.91 (ref 0.77–1.02)
LYMPHOCYTES # BLD AUTO: 1 10^3/UL (ref 1–4.8)
MAGNESIUM SERPL-MCNC: 1.9 MG/DL (ref 1.9–2.7)
MCH RBC QN AUTO: 32 PG (ref 27–31)
MCHC RBC AUTO-ENTMCNC: 33 G/DL (ref 31–36)
MCV RBC AUTO: 96 FL (ref 80–94)
MONOCYTES # BLD AUTO: 0.5 10^3/UL (ref 0–0.8)
NEUTROPHILS # BLD AUTO: 4.9 10^3/UL (ref 1.5–7.7)
NRBC # BLD AUTO: 0 10^3/UL
NRBC BLD QL AUTO: 0
PLATELET # BLD AUTO: 249 10^3/UL (ref 150–450)
POTASSIUM SERPL-SCNC: 3.9 MMOL/L (ref 3.5–5)
PROT SERPL-MCNC: 7.1 G/DL (ref 6.4–8.9)
RBC # BLD AUTO: 4.22 10^6/UL (ref 4–5.4)
SODIUM SERPL-SCNC: 135 MMOL/L (ref 135–145)
TROPONIN I SERPL-MCNC: 0 NG/ML (ref ?–0.04)
TSH SERPL-ACNC: 10.16 MCIU/ML (ref 0.34–5.6)
WBC # BLD AUTO: 6.6 10^3/UL (ref 3.5–10.8)

## 2019-02-24 PROCEDURE — 85610 PROTHROMBIN TIME: CPT

## 2019-02-24 PROCEDURE — 94640 AIRWAY INHALATION TREATMENT: CPT

## 2019-02-24 PROCEDURE — 83605 ASSAY OF LACTIC ACID: CPT

## 2019-02-24 PROCEDURE — 70450 CT HEAD/BRAIN W/O DYE: CPT

## 2019-02-24 PROCEDURE — G0480 DRUG TEST DEF 1-7 CLASSES: HCPCS

## 2019-02-24 PROCEDURE — 36415 COLL VENOUS BLD VENIPUNCTURE: CPT

## 2019-02-24 PROCEDURE — 80320 DRUG SCREEN QUANTALCOHOLS: CPT

## 2019-02-24 PROCEDURE — 81003 URINALYSIS AUTO W/O SCOPE: CPT

## 2019-02-24 PROCEDURE — 84550 ASSAY OF BLOOD/URIC ACID: CPT

## 2019-02-24 PROCEDURE — 82140 ASSAY OF AMMONIA: CPT

## 2019-02-24 PROCEDURE — 84439 ASSAY OF FREE THYROXINE: CPT

## 2019-02-24 PROCEDURE — 85025 COMPLETE CBC W/AUTO DIFF WBC: CPT

## 2019-02-24 PROCEDURE — 80053 COMPREHEN METABOLIC PANEL: CPT

## 2019-02-24 PROCEDURE — 82607 VITAMIN B-12: CPT

## 2019-02-24 PROCEDURE — 99285 EMERGENCY DEPT VISIT HI MDM: CPT

## 2019-02-24 PROCEDURE — 70486 CT MAXILLOFACIAL W/O DYE: CPT

## 2019-02-24 PROCEDURE — 80048 BASIC METABOLIC PNL TOTAL CA: CPT

## 2019-02-24 PROCEDURE — 71045 X-RAY EXAM CHEST 1 VIEW: CPT

## 2019-02-24 PROCEDURE — 84484 ASSAY OF TROPONIN QUANT: CPT

## 2019-02-24 PROCEDURE — 84443 ASSAY THYROID STIM HORMONE: CPT

## 2019-02-24 PROCEDURE — 93005 ELECTROCARDIOGRAM TRACING: CPT

## 2019-02-24 PROCEDURE — 81015 MICROSCOPIC EXAM OF URINE: CPT

## 2019-02-24 PROCEDURE — 83735 ASSAY OF MAGNESIUM: CPT

## 2019-02-24 PROCEDURE — 82550 ASSAY OF CK (CPK): CPT

## 2019-02-24 NOTE — ED
Adult Trauma





- HPI Summary


HPI Summary: 





Patient with history of chronic EtOH and instability complains of mechanical 

fall tonight.  Patient was accompanied by family when he fell.  History of 

falls due to alcohol.  Patient fell on the concrete has skin tears to both 

elbows and laceration above right eye.  Patient alert and oriented.  Denies any 

other pain injury or symptoms.  Family denies LOC, AMS.  Patient denies vision 

change, N/V, HA, neck pain, back pain or abdominal pain, chest pain, SOB.  

Patient ambulatory.  No anti-coag.  History of dementia





- History of Current Complaint


Chief Complaint: EDFacialInjury


Stated Complaint: FALL


Time Seen by Provider: 02/24/19 18:07


Hx Obtained From: Patient, Family/Caretaker


Hx From Patient Unobtainable Due To: Dementia


Mechanism of Injury: Fall


Ambulatory at the Scene: Yes


Loss of Consciousness: no loss of consciousness


Current Severity: None


Pain Intensity: 0


Pain Scale Used: 0-10 Numeric


Location: Head


Aggravating Factor(s): Nothing


Alleviating Factor(s): Nothing


Associated Signs & Symptoms: Positive: Negative





- Additional Pertinent History


Primary Care Physician: [id number]





- Allergy/Home Medications


Allergies/Adverse Reactions: 


 Allergies











Allergy/AdvReac Type Severity Reaction Status Date / Time


 


MS Penicillins [PCN] Allergy  Itching Verified 02/24/19 20:42











Home Medications: 


 Home Medications





Hydrochlorothiazide 12.5 mg PO DAILY 02/24/19 [History Confirmed 02/24/19]











PMH/Surg Hx/FS Hx/Imm Hx


Endocrine/Hematology History: 


   Denies: Hx Diabetes


Cardiovascular History: Reports: Hx Hypertension


Respiratory History: Reports: Hx Chronic Obstructive Pulmonary Disease (COPD)


   Denies: Hx Asthma


 History: 


   Denies: Hx Dialysis


Sensory History: Reports: Hx Contacts or Glasses, Hx Deafness, Hx Hearing Aid


Opthamlomology History: Reports: Hx Contacts or Glasses


Neurological History: 


   Denies: Hx Dementia, Hx Seizures


Psychiatric History: 


   Denies: Hx Autism





- Cancer History


Cancer Type, Location and Year: Denies Hx CA





- Surgical History


Surgery Procedure, Year, and Place: bilateral ankles - years ago


Infectious Disease History: No


Infectious Disease History: 


   Denies: Traveled Outside the [place] in Last 30 Days





- Family History


Known Family History: Positive: Other - Spinal tumor





- Social History


Alcohol Use: Daily


Alcohol Amount: 12+ beers and whiskey daily


Substance Use Type: Reports: None


Smoking Status (MU): Former Smoker





Review of Systems


Constitutional: Negative


Eyes: Negative


ENT: Negative


Cardiovascular: Negative


Gastrointestinal: Negative


Genitourinary: Negative


Musculoskeletal: Negative


Skin: Other


Neurological: Negative


Psychological: Normal


All Other Systems Reviewed And Are Negative: Yes





Physical Exam





- Summary


Physical Exam Summary: 





Skin tears bilateral elbows.  Normal flexion and extension of bilateral elbows 

without pain.  Small laceration to left elbow.  Laceration above right eye.  

Neuro exam normal.  Patient alert and oriented.  No pain with palpation of neck

, chest wall, abdomen, back.  No other trauma noted to mouth, face or head.  

Full range of motion of jaw.  Lower extremities flex and extend bilaterally 

without any pain.


Triage Information Reviewed: Yes


Vital Signs On Initial Exam: 


 Initial Vitals











Temp Pulse Resp BP Pulse Ox


 


 98.3 F   69   20   133/74   95 


 


 02/24/19 18:02  02/24/19 18:02  02/24/19 18:02  02/24/19 18:02  02/24/19 18:02











Vital Signs Reviewed: Yes


Appearance: Positive: Well-Appearing


Head/Face: Positive: Normal Head/Face Inspection


Eyes: Positive: Normal


Dental: Negative: Dental Fracture @, Bleeding


Neck: Positive: Supple


Respiratory/Lung Sounds: Positive: Clear to Auscultation


Cardiovascular: Positive: Normal


Abdomen Description: Positive: Nontender


Musculoskeletal: Positive: Normal


Neurological: Positive: Normal


Psychiatric: Positive: Normal


AVPU Assessment: Alert





- Bebeto Coma Scale


Best Eye Response: 4 - Spontaneous


Best Motor Response: 6 - Obeys Commands


Best Verbal Response: 5 - Oriented


Coma Scale Total: 15





Procedures





- Laceration/Wound Repair


  ** 1


Location: face


Description: Irregular


Anesthesia: Local, 1.0%


Length, Depth and Shape: 3cm x 1cm


Irrigated w/ Saline (ccs): 300


Debridement: minimal


Number of Sutures: 5 - 6.0 ethilon


Layer Closure?: No


Sterile Dressing Applied?: No





  ** 2 


Location: upper extremity


Description: Linear


Anesthesia: Local, 1.0%


Length, Depth and Shape: 2cm x 1cm


Irrigated w/ Saline (ccs): 200


Laceration/Wound Explored: clean


Debridement: minimal


Number of Sutures: 2 - 5.0 ethilon


Layer Closure?: No


Sterile Dressing Applied?: No





Diagnostics





- Vital Signs


 Vital Signs











  Temp Pulse Resp BP Pulse Ox


 


 02/24/19 21:23    15  130/79 


 


 02/24/19 21:01    16  


 


 02/24/19 20:53    12  126/76 


 


 02/24/19 20:27    18  114/77 


 


 02/24/19 20:01    14  


 


 02/24/19 19:00    23  


 


 02/24/19 18:25    15  


 


 02/24/19 18:23    15  133/74 


 


 02/24/19 18:02  98.3 F  69  20  133/74  95














- Laboratory


Lab Results: 


 Lab Results











  02/24/19 02/24/19 02/24/19 Range/Units





  18:32 18:32 18:32 


 


WBC  6.6    (3.5-10.8)  10^3/ul


 


RBC  4.22    (4.00-5.40)  10^6/ul


 


Hgb  13.4 L    (14.0-18.0)  g/dl


 


Hct  41 L    (42-52)  %


 


MCV  96 H    (80-94)  fL


 


MCH  32 H    (27-31)  pg


 


MCHC  33    (31-36)  g/dl


 


RDW  14    (10.5-15)  %


 


Plt Count  249    (150-450)  10^3/ul


 


MPV  7.0 L    (7.4-10.4)  fL


 


Neut % (Auto)  74.0    %


 


Lymph % (Auto)  14.6    %


 


Mono % (Auto)  7.7    %


 


Eos % (Auto)  3.2    %


 


Baso % (Auto)  0.5    %


 


Absolute Neuts (auto)  4.9    (1.5-7.7)  10^3/ul


 


Absolute Lymphs (auto)  1.0    (1.0-4.8)  10^3/ul


 


Absolute Monos (auto)  0.5    (0-0.8)  10^3/ul


 


Absolute Eos (auto)  0.2    (0-0.6)  10^3/ul


 


Absolute Basos (auto)  0    (0-0.2)  10^3/ul


 


Absolute Nucleated RBC  0    10^3/ul


 


Nucleated RBC %  0    


 


INR (Anticoag Therapy)   0.91   (0.77-1.02)  


 


Sodium    135  (135-145)  mmol/L


 


Potassium    3.9  (3.5-5.0)  mmol/L


 


Chloride    105  (101-111)  mmol/L


 


Carbon Dioxide    20 L  (22-32)  mmol/L


 


Anion Gap    10  (2-11)  mmol/L


 


BUN    27 H  (6-24)  mg/dL


 


Creatinine    1.20 H  (0.67-1.17)  mg/dL


 


Est GFR ( Amer)    69.0  (>60)  


 


Est GFR (Non-Af Amer)    57.0  (>60)  


 


BUN/Creatinine Ratio    22.5 H  (8-20)  


 


Glucose    94  ()  mg/dL


 


Lactic Acid     (0.5-2.0)  mmol/L


 


Calcium    9.4  (8.6-10.3)  mg/dL


 


Magnesium    1.9  (1.9-2.7)  mg/dL


 


Total Bilirubin    0.30  (0.2-1.0)  mg/dL


 


AST    17  (13-39)  U/L


 


ALT    11  (7-52)  U/L


 


Alkaline Phosphatase    57  ()  U/L


 


Ammonia     (16-53)  mcmol/L


 


Total Creatine Kinase    58  ()  U/L


 


Troponin I    0.00  (<0.04)  ng/mL


 


Total Protein    7.1  (6.4-8.9)  g/dL


 


Albumin    4.2  (3.2-5.2)  g/dL


 


Globulin    2.9  (2-4)  g/dL


 


Albumin/Globulin Ratio    1.4  (1-3)  


 


TSH    10.16 H  (0.34-5.60)  mcIU/mL


 


Serum Alcohol    218 H  (<10)  mg/dL














  02/24/19 02/24/19 Range/Units





  18:32 18:32 


 


WBC    (3.5-10.8)  10^3/ul


 


RBC    (4.00-5.40)  10^6/ul


 


Hgb    (14.0-18.0)  g/dl


 


Hct    (42-52)  %


 


MCV    (80-94)  fL


 


MCH    (27-31)  pg


 


MCHC    (31-36)  g/dl


 


RDW    (10.5-15)  %


 


Plt Count    (150-450)  10^3/ul


 


MPV    (7.4-10.4)  fL


 


Neut % (Auto)    %


 


Lymph % (Auto)    %


 


Mono % (Auto)    %


 


Eos % (Auto)    %


 


Baso % (Auto)    %


 


Absolute Neuts (auto)    (1.5-7.7)  10^3/ul


 


Absolute Lymphs (auto)    (1.0-4.8)  10^3/ul


 


Absolute Monos (auto)    (0-0.8)  10^3/ul


 


Absolute Eos (auto)    (0-0.6)  10^3/ul


 


Absolute Basos (auto)    (0-0.2)  10^3/ul


 


Absolute Nucleated RBC    10^3/ul


 


Nucleated RBC %    


 


INR (Anticoag Therapy)    (0.77-1.02)  


 


Sodium    (135-145)  mmol/L


 


Potassium    (3.5-5.0)  mmol/L


 


Chloride    (101-111)  mmol/L


 


Carbon Dioxide    (22-32)  mmol/L


 


Anion Gap    (2-11)  mmol/L


 


BUN    (6-24)  mg/dL


 


Creatinine    (0.67-1.17)  mg/dL


 


Est GFR ( Amer)    (>60)  


 


Est GFR (Non-Af Amer)    (>60)  


 


BUN/Creatinine Ratio    (8-20)  


 


Glucose    ()  mg/dL


 


Lactic Acid   1.7  (0.5-2.0)  mmol/L


 


Calcium    (8.6-10.3)  mg/dL


 


Magnesium    (1.9-2.7)  mg/dL


 


Total Bilirubin    (0.2-1.0)  mg/dL


 


AST    (13-39)  U/L


 


ALT    (7-52)  U/L


 


Alkaline Phosphatase    ()  U/L


 


Ammonia  44   (16-53)  mcmol/L


 


Total Creatine Kinase    ()  U/L


 


Troponin I    (<0.04)  ng/mL


 


Total Protein    (6.4-8.9)  g/dL


 


Albumin    (3.2-5.2)  g/dL


 


Globulin    (2-4)  g/dL


 


Albumin/Globulin Ratio    (1-3)  


 


TSH    (0.34-5.60)  mcIU/mL


 


Serum Alcohol    (<10)  mg/dL











Result Diagrams: 


 02/24/19 18:32





 02/24/19 18:32


Lab Statement: Any lab studies that have been ordered have been reviewed, and 

results considered in the medical decision making process.





Adult Trauma Course/Dx





- Course


Course Of Treatment: Patient with history of chronic EtOH and instability 

complains of witnessed mechanical fall tonight.  Patient fell on the concrete 

has skin tears to both elbows and laceration above right eye.  Patient alert 

and oriented.  Denies any other pain injury or symptoms.  Family denies LOC, 

AMS.  Patient denies vision change, N/V, HA, neck pain, back pain or abdominal 

pain, chest pain, SOB.  Patient ambulatory.  No anti-coag.  History of 

dementia.  Physical exam:Skin tears bilateral elbows.  Normal flexion and 

extension of bilateral elbows without pain.  Small laceration to left elbow.  

Laceration above right eye.  Neuro exam normal.  Patient alert and oriented.  

No pain with palpation of neck, chest wall, abdomen, back.  No other trauma 

noted to mouth, face or head.  Full range of motion of jaw.  Lower extremities 

flex and extend bilaterally without any pain.  Vital signs within normal 

limits.  Creatinine 1.2.  EtOH 218.  TSH 10.16.  Labs otherwise unremarkable.  

EKG sinus rhythm.  CT maxillofacial negative.  CT brain acute on chronic 

subdural hematoma.  Discussed patient with Dr. Toro who recommended 

admission and observation.  Will be admitted to hospitalist.  2 sutures left 

elbow.  10 sutures right eyebrow.





- Diagnoses


Provider Diagnoses: 


 Laceration, Acute on chronic intracranial subdural hematoma, Skin tear, Fall, 

ETOH abuse








Discharge





- Sign-Out/Discharge


Documenting (check all that apply): Patient Departure


Patient Received Moderate/Deep Sedation with Procedure: No





- Discharge Plan


Condition: Stable


Disposition: ADMITTED TO [facility]





- Billing Disposition and Condition


Condition: STABLE


Disposition: Admitted to [facility]

## 2019-02-25 RX ADMIN — HALOPERIDOL LACTATE PRN MG: 5 INJECTION, SOLUTION INTRAMUSCULAR at 19:35

## 2019-02-25 RX ADMIN — HEPARIN SODIUM SCH: 5000 INJECTION INTRAVENOUS; SUBCUTANEOUS at 15:25

## 2019-02-25 RX ADMIN — HYDROCHLOROTHIAZIDE SCH MG: 25 TABLET ORAL at 09:35

## 2019-02-25 RX ADMIN — PANTOPRAZOLE SODIUM SCH MG: 40 TABLET, DELAYED RELEASE ORAL at 09:34

## 2019-02-25 RX ADMIN — LORAZEPAM SCH MG: 1 TABLET ORAL at 11:21

## 2019-02-25 RX ADMIN — FOLIC ACID SCH MG: 1 TABLET ORAL at 09:34

## 2019-02-25 RX ADMIN — TIOTROPIUM BROMIDE SCH: 18 CAPSULE ORAL; RESPIRATORY (INHALATION) at 07:32

## 2019-02-25 RX ADMIN — FERROUS SULFATE TAB 325 MG (65 MG ELEMENTAL FE) SCH MG: 325 (65 FE) TAB at 09:34

## 2019-02-25 RX ADMIN — LORAZEPAM SCH MG: 1 TABLET ORAL at 21:07

## 2019-02-25 RX ADMIN — MOMETASONE FUROATE AND FORMOTEROL FUMARATE DIHYDRATE SCH: 200; 5 AEROSOL RESPIRATORY (INHALATION) at 19:07

## 2019-02-25 RX ADMIN — HEPARIN SODIUM SCH UNITS: 5000 INJECTION INTRAVENOUS; SUBCUTANEOUS at 21:07

## 2019-02-25 RX ADMIN — PANTOPRAZOLE SODIUM SCH MG: 40 TABLET, DELAYED RELEASE ORAL at 21:07

## 2019-02-25 RX ADMIN — MOMETASONE FUROATE AND FORMOTEROL FUMARATE DIHYDRATE SCH PUFF: 200; 5 AEROSOL RESPIRATORY (INHALATION) at 07:25

## 2019-02-25 RX ADMIN — HEPARIN SODIUM SCH UNITS: 5000 INJECTION INTRAVENOUS; SUBCUTANEOUS at 05:06

## 2019-02-25 RX ADMIN — Medication SCH MG: at 09:33

## 2019-02-25 NOTE — PN
Progress Note





- Progress Note


Date of Service: 02/25/19


SOAP: 


Subjective:


[]Patient s/p fall striking right periorbital area. Seen in ER where CT showed 

small right mostly chronic SDH with no mass effect. Admitted for observation.


Currently awake, alert, c/o rt hand pain








Objective:


[]Sutured laceration over right eye


ERT hand moderate swelling laterally


Neuro intact








Assessment:


[]Stable








Plan:


[]F/U CT pending


Will order X ray of RT hand

## 2019-02-25 NOTE — HP
HISTORY AND PHYSICAL:

 

DATE OF ADMISSION:  02/25/19

 

PRIMARY CARE PROVIDER:  Philip Rosenthal MD

 

HEALTHCARE PROXY:  Livia Payne, the patient's wife.

 

CODE STATUS:  Full.

 

CHIEF COMPLAINT:  Mechanical fall and altered mental status.

 

SOURCE OF INFORMATION:  History is obtained from interview of patient and 
interview of medical chart.  The patient is a poor historian.

 

HISTORY OF PRESENT ILLNESS:  An 89-year-old male with a past medical history of 
alcohol use disorder, currently drinks 12 beers and whisky daily; history of 
dementia; chronic encephalopathy with recurrent falls; CKD, stage 3; 
hypertension; COPD who presented to the emergency room today status post 
mechanical fall with a blood alcohol level of 200.  His family members reported 
that he tripped and fell on the concrete and tried to brace the fall with his 
hands, hitting both of his elbows and hands and then he hit his right forehead.
  They deny loss of consciousness after this event.  The patient was ambulatory 
after the event and because he was bleeding, his family brought him to the 
emergency room where they thought he might require sutures for a wound above 
his right eye as well as on his elbows.

 

EMERGENCY ROOM COURSE:  In the emergency room, the patient's vitals were 111/89
, heart rate 75 sinus, afebrile, satting 96% on room air.  The patient was 
alert and oriented x1 with no focal neurologic deficits, which per his family 
is his baseline (this is according to the emergency room staff as the family 
had the left by the time that I interviewed him). Labs showed mild normocytic 
anemia, creatinine of 1.2, which is consistent with his baseline.  Troponin is 
flat.  TSH is also slightly elevated at 10.  Urinalysis was done, which showed 1
+ blood, otherwise normal.  Tox showed a blood alcohol level of 218. The patient
's last drink was this evening.  A CT head was performed, which showed a small 
right-sided acute on chronic subdural hematoma as well as age-related atrophy 
and maxillofacial CT showed right supraorbital contusion without fracture.  
Elbow radiographs bilaterally were taken, which showed no acute fracture on my 
gross approximation.  Chest x-ray showed gastric bubble, but no acute 
cardiopulmonary disease.  An EKG was performed, which showed sinus with first-
degree block that was unchanged from prior as well as T wave inversions in 3 
with no other acute findings of ischemia.  Secondary to his CT head findings as 
well as the patient's recent fall and elevated blood alcohol level, the ED 
asked hospitalist team to evaluate this patient for observation status and 
further monitoring.  The ED staff did speak with neurosurgery on-call who 
reported the patient will need repeat CT head the following day.

 

PAST MEDICAL HISTORY:

1.  Alcohol use disorder with at least 12 beers and unknown amount of whisky 
daily for many years.

2.  Dementia with baseline mental status, alert and oriented x1.

3.  Recurrent falls.

4.  CKD, stage 3.

5.  Hypertension.

6.  COPD.

 

PAST SURGICAL HISTORY:  Unknown.

 

MEDICATIONS:  Unverified are,

1.  Hydrochlorothiazide 12.5 mg p.o. daily.

2.  Multivitamins 1 tab p.o. daily.

3.  Polyethylene glycol 17 g p.o. daily p.r.n.

4.  Acetaminophen 650 mg p.o. q.4 hours p.r.n. for pain.

5.  Albuterol 2.5 mg inhaled q.2 hours p.r.n. for wheezing.

6.  Ferrous sulfate 325 mg p.o. daily.

7.  Folic acid 1 mg p.o. daily.

8.  Haldol 0.5 mg p.o. q.4 hours p.r.n. for agitation.

9.  Mometasone 2 puffs inhaled b.i.d.

10.  Omeprazole 20 mg p.o. b.i.d.

11.  Thiamine 100 mg p.o. daily.

12.  Tiotropium 1 cap inhaled daily.

 

ALLERGIES:  To PENICILLIN.

 

FAMILY HISTORY:  Unknown.

 

SOCIAL HISTORY:  He has current alcohol use.  He is a former tobacco user and 
never illicit drug user.  The patient lives with his wife.  He has supportive 
family with a daughter and a son-in-law who is a .  Unknown former 
occupation.

 

REVIEW OF SYSTEMS:  The patient denies fever, chills, malaise.  HEENT:  Denies 
vision changes.  Does endorse mild headache.  Denies dysphagia.  Cardiovascular
: Denies chest pain, palpitations.  Respiratory:  Denies shortness of breath or 
cough.  GI:  He denies nausea, vomiting, diarrhea, or abdominal pain.  :  He 
dysuria or hematuria.  Musculoskeletal:  He does endorse bilateral elbow pain 
as well as right eye pain.  Skin:  He denies new rashes or lesions aside from 
new hematomas on bilateral elbows.  Neurologic:  The patient denies focal 
weakness.  He does endorse chronic bilateral lower extremity neuropathy that is 
unchanged. Psychiatric:  The patient is a poor historian and oriented to himself
, but denies anxiety or depression.  Denies suicidal ideation or homicidal 
ideation.

 

                               PHYSICAL EXAMINATION

 

GENERAL:  This is a well-developed, well-nourished, elderly gentleman, who is 
lying in bed, chatting and pleasant, in no acute distress, ambulating at times 
around the floor.

 

VITAL SIGNS:  At the time of my exam are blood pressure 135/82, heart rate 
sinus 72, respiratory rate of 12, oxygen saturation 97% on room air.

 

HEENT:  He is normocephalic.  Does have right orbital sutures in place 
consistent with prior trauma.  His oropharynx is clear.  He has dry mucous 
membranes.

 

LYMPHATIC:  He has no supraclavicular or cervical lymphadenopathy.

 

PULMONARY:  He is clear to auscultation bilaterally.

 

CARDIAC:  He has regular rate and rhythm with no murmurs, rubs, or gallops.

 

GI:  His belly is soft, nontender, nondistended and normoactive bowel sounds.

 

MUSCULOSKELETAL:  He does have full range of motions of all 4 extremities, 
although his pain on active range of motion of bilateral extension of elbows.

 

NEURO:  The patient is alert and oriented x1.  He believes the date is 2004, he 
is unsure of the month.  He does not why he is here.  He denies history of 
alcohol use disorder.  He is very pleasant and cooperative with exam.  His 
cranial nerves II through XII are grossly intact and he has no focal neurologic 
deficits.  He does ambulate with a wide-based gait, but without any weakness.  
He has moderate diminished sensation to bilateral lower extremities that is 
equal.

 

SKIN:  He has bilateral hematomas that are wrapped on elbows as well as 
hematoma and right supraorbital.

 

 LABORATORY DATA:  Labs were obtained, which show white blood cell count of 6.6
, hemoglobin of 13.4, hematocrit of 41, platelets of 249.  BMP was done, which 
showed sodium of 135, potassium 3.9, chloride 105, carbon dioxide 20, BUN 27, 
creatinine 1.2, and glucose 94.  Lactic acid was 1.7.  LFTs were drawn, which 
showed AST 17, ALT 11, alkaline phosphatase 57.  Ammonia 44.  Troponin was 0.  
TSH was 10.16. Urine was obtained, which showed 1+ blood, but otherwise no 
remarkable findings. Serum alcohol was done, which was at a level of 218.

 

IMAGING:  Bilateral elbow radiographs were done, which showed no gross acute 
fracture.  Maxillofacial CT was done, which showed right supraorbital 
contusion. Chest x-ray was done, which showed no acute cardiopulmonary disease.
  Brain CT was done, which showed right acute on chronic subdural hematoma as 
well as chronic microvascular disease and atrophic changes.  An EKG was done, 
which showed sinus rhythm in the 70s with first-degree block as well as T wave 
inversions in lead 3 that were unchanged from prior.  All films and EKG were 
reviewed by me personally.

 

ASSESSMENT AND PLAN:  This is an 89-year-old male with a past medical history 
of alcohol use disorder; dementia; recurrent falls; chronic kidney disease, 
stage 3; hypertension; and chronic obstructive pulmonary disease, who presented 
to the ER today status post mechanical fall with a blood alcohol level of 200, 
found to have acute on chronic subdural hematoma and admitted for observation.

 

1.  Acute on chronic subdural hematoma.  He has no acute neurologic deficits. 
Appears to be oriented x1, which is his baseline.  

--Repeat CT head. 

--According to ER physicians, Neurosurgery was consulted in the emergency room 
who recommended just repeat CT head and it showed no changes. --- 

--Continue neuro checks q.4 hours.

2.  Alcohol use disorder with elevated blood alcohol level.  The patient does 
have a history of withdrawal, though uncomplicated from prior hospitalizations.
  

--Start thiamine, folate, WAM protocol with p.r.n. Ativan.  We will hydrate 
with normal saline for 125 mL x1 bag.

3.  Dementia.  He appears at baseline.  We will consult social work.  He has a 
home medication of Haldol 0.5 mg p.o. p.r.n. for agitation. Can send for B12, 
folate, TSH elevated as per below

4.  Recurrent mechanical falls.  Pain control with Tylenol will be offered.  We 
will order PT/OT.  He will need outpatient followup for suture removal.

5.  Hypertension.  We will resume home medications of amlodipine.

6.  Chronic kidney disease.  There is no evidence of acute kidney injury.

7.  Elevated TSH.  Unknown significance.  In this case, we will check T4 on 
a.m. labs.

8.  FEN.  It is unrestricted.

9.  Code status is full.

10.  DVT prophylaxis is subcu heparin.

11.  Disposition is observation to the floor.

 

Family was unable to be reached as they have left the emergency room earlier in 
the evening, although they were aware of his admission and plan was discussed 
with the emergency room provider.  The patient is agreeable to plan and has no 
further questions.  Primary care provider will be contacted during this 
admission.

 

TIME SPENT:  Forty minutes was spent in the execution of this admission and H 
and P with over half of that spent directly at the bedside of the patient 
providing direct care.

 

 

 

[id number]/CPS #: [id number]

RENY

## 2019-02-25 NOTE — PN
Subjective


Date of Service: 02/25/19


Interval History: 





Patient is feeling well today, Patient is beginning to feel shaky and is 

unstady on his feet. Patient denies F/C, N/V, abdominal pain, changes in vision

, passing out, CP, SOB, weakness or other pain. Patient has been sneaking hard 

alcohol and has been buying beer when he goes to the grocery store with her. 

Patient recently tore his rotator cuff and fell just before admission due to 

his drinking. Patient's wife says she is interested in him stopping drinking 

due to the hazard it poses to his health. 


Family History: Unchanged from Admission


Social History: Unchanged from Admission


Past Medical History: Unchanged from Admission





Objective


Active Medications: 








Acetaminophen (Tylenol Tab*)  650 mg PO Q4H PRN


   PRN Reason: PAIN


Albuterol (Ventolin 2.5 Mg/3 Ml Neb.Sol*)  2.5 mg INH Q2H PRN


   PRN Reason: SOB/WHEEZING


Ferrous Sulfate (Ferrous Sulfate Tab*)  325 mg PO DAILY [facility]


   Last Admin: 02/25/19 09:34 Dose:  325 mg


Folic Acid (Folvite Tab*)  1 mg PO DAILY [facility]


   Last Admin: 02/25/19 09:34 Dose:  1 mg


Haloperidol (Haldol Tab*)  0.5 mg PO Q4H PRN


   PRN Reason: AGITATION


Heparin Sodium (Porcine) (Heparin Vial(*))  5,000 units SUBCUT Q8HR [facility]


   Last Admin: 02/25/19 05:06 Dose:  5,000 units


Hydrochlorothiazide (Hydrodiuril Tab*)  12.5 mg PO DAILY [facility]


   Last Admin: 02/25/19 09:35 Dose:  12.5 mg


Lorazepam (Ativan Tab(*))  0 - 6 mg PO .PER [facility] PROTOCOL [facility]; Protocol


   Last Admin: 02/25/19 11:21 Dose:  2 mg


Mometasone Furoate/Formoterol Fumar (Dulera 200/5 Mdi*)  2 puff INH BID [facility]


   Last Admin: 02/25/19 07:25 Dose:  2 puff


Pantoprazole Sodium (Protonix Tab*)  40 mg PO BID [facility]


   Last Admin: 02/25/19 09:34 Dose:  40 mg


Thiamine HCl (Vitamin B-1 Tab*)  100 mg PO DAILY [facility]


   Last Admin: 02/25/19 09:33 Dose:  100 mg


Tiotropium Bromide (Spiriva Cap.Inh*)  1 cap INH DAILY [facility]


   Last Admin: 02/25/19 07:32 Dose:  Not Given








 Vital Signs - 8 hr











  02/25/19 02/25/19 02/25/19





  07:25 08:55 11:14


 


Temperature 97.6 F 97.1 F 


 


Pulse Rate 84 88 102


 


Respiratory 18 16 20





Rate   


 


Blood Pressure 147/72 126/71 110/78





(mmHg)   


 


O2 Sat by Pulse 100  100





Oximetry   














  02/25/19





  11:21


 


Temperature 


 


Pulse Rate 


 


Respiratory 18





Rate 


 


Blood Pressure 





(mmHg) 


 


O2 Sat by Pulse 





Oximetry 











Oxygen Devices in Use Now: None


Appearance: Patient is an 89yo male who appears stated age and is sitting in 

the bed in NAD.


Eyes: No Scleral Icterus, PERRLA


Ears/Nose/Mouth/Throat: NL Teeth, Lips, Gums, Clear Oropharnyx, Mucous 

Membranes Moist, -


Neck: NL Appearance and Movements; NL JVP, Trachea Midline


Respiratory: Symmetrical Chest Expansion and Respiratory Effort, Clear to 

Auscultation


Cardiovascular: NL Sounds; No Murmurs; No JVD, RRR, No Edema


Abdominal: NL Sounds; No Tenderness; No Distention, No Hepatosplenomegaly


Lymphatic: No Cervical Adenopathy


Extremities: No Edema, No Clubbing, Cyanosis


Skin: No Nodules or Sclerosis, - - Bruises over arms and legs. Laceration on 

face. 


Neurological: - - Poor Balance, Tremulous, alert only to self, very unstable on 

standing. 


Result Diagrams: 


 02/24/19 18:32





 02/24/19 18:32


Additional Lab and Data: 


 Lab Results

















Assess/Plan/Problems-Billing


Assessment: 





Patient is an 89yo male with a PMH for Dementia, alcoholism, CKD, HTN, COPD, 

who is admitted for falls and acute on chronic subdural hematoma which is 

stable but he is now withdrawing from alcohol. Patient is not capable of making 

his own medical decisions and his wife and surrogate decision maker is 

interested in alcohol detox and abstinence. 





- Patient Problems


(1) Subdural hematoma


Current Visit: Yes   Status: Acute   Code(s): S06.5X9A - TRAUM SUBDR HEM W LOC 

OF UNSP DURATION, INIT   SNOMED Code(s): 483572847


   Comment: 


- Small, stable on repeat CT brain


- Appreciate Neurosurgery consult


- Monitor PRN, no indication for surgery.    





(2) Alcoholism /alcohol abuse


Current Visit: No   Status: Acute   Code(s): F10.20 - ALCOHOL DEPENDENCE, 

UNCOMPLICATED   SNOMED Code(s): 0854296


   Comment: 


- SW consult ordered


- Wife is interested in patient being abstinent from alcohol and he lacks 

capacity to understand the health ramifications at this stage in his dementia


- Patient is beginning to withdraw from alcohol


- Start WA protocol, give vitamins.    





(3) Altered mental status


Current Visit: No   Status: Acute   Code(s): R41.82 - ALTERED MENTAL STATUS, 

UNSPECIFIED   SNOMED Code(s): 633266067


   Comment: 


- Likely alcohol withdrawal on top of underlying dementia   





(4) COPD (chronic obstructive pulmonary disease)


Current Visit: No   Status: Acute   Code(s): J44.9 - CHRONIC OBSTRUCTIVE 

PULMONARY DISEASE, UNSPECIFIED   SNOMED Code(s): 09827951


   Comment: 


- No signs of exacerbation


- Continue Dulera, Spiriva, and Albuterol PRN.    





(5) DVT prophylaxis


Current Visit: No   Status: Acute   Code(s): SFU0425 -    SNOMED Code(s): 

632483120


   Comment: 


- SCDs in setting of SDH

## 2019-02-26 LAB
ALBUMIN SERPL BCG-MCNC: 3.9 G/DL (ref 3.2–5.2)
ALBUMIN/GLOB SERPL: 1.4 {RATIO} (ref 1–3)
ALP SERPL-CCNC: 70 U/L (ref 34–104)
ALT SERPL W P-5'-P-CCNC: 10 U/L (ref 7–52)
ANION GAP SERPL CALC-SCNC: 9 MMOL/L (ref 2–11)
AST SERPL-CCNC: 16 U/L (ref 13–39)
BASOPHILS # BLD AUTO: 0 10^3/UL (ref 0–0.2)
BUN SERPL-MCNC: 21 MG/DL (ref 6–24)
BUN/CREAT SERPL: 20.8 (ref 8–20)
CALCIUM SERPL-MCNC: 9.5 MG/DL (ref 8.6–10.3)
CHLORIDE SERPL-SCNC: 106 MMOL/L (ref 101–111)
EOSINOPHIL # BLD AUTO: 0 10^3/UL (ref 0–0.6)
GLOBULIN SER CALC-MCNC: 2.7 G/DL (ref 2–4)
GLUCOSE SERPL-MCNC: 115 MG/DL (ref 70–100)
HCO3 SERPL-SCNC: 24 MMOL/L (ref 22–32)
HCT VFR BLD AUTO: 43 % (ref 42–52)
HGB BLD-MCNC: 14.3 G/DL (ref 14–18)
LYMPHOCYTES # BLD AUTO: 0.7 10^3/UL (ref 1–4.8)
MCH RBC QN AUTO: 32 PG (ref 27–31)
MCHC RBC AUTO-ENTMCNC: 33 G/DL (ref 31–36)
MCV RBC AUTO: 96 FL (ref 80–94)
MONOCYTES # BLD AUTO: 0.9 10^3/UL (ref 0–0.8)
NEUTROPHILS # BLD AUTO: 7.5 10^3/UL (ref 1.5–7.7)
NRBC # BLD AUTO: 0 10^3/UL
NRBC BLD QL AUTO: 0
PLATELET # BLD AUTO: 232 10^3/UL (ref 150–450)
POTASSIUM SERPL-SCNC: 3.6 MMOL/L (ref 3.5–5)
PROT SERPL-MCNC: 6.6 G/DL (ref 6.4–8.9)
RBC # BLD AUTO: 4.49 10^6/UL (ref 4–5.4)
SODIUM SERPL-SCNC: 139 MMOL/L (ref 135–145)
WBC # BLD AUTO: 9 10^3/UL (ref 3.5–10.8)

## 2019-02-26 PROCEDURE — 0HQDXZZ REPAIR RIGHT LOWER ARM SKIN, EXTERNAL APPROACH: ICD-10-PCS

## 2019-02-26 PROCEDURE — 0HQ1XZZ REPAIR FACE SKIN, EXTERNAL APPROACH: ICD-10-PCS

## 2019-02-26 PROCEDURE — 0HQEXZZ REPAIR LEFT LOWER ARM SKIN, EXTERNAL APPROACH: ICD-10-PCS

## 2019-02-26 RX ADMIN — MOMETASONE FUROATE AND FORMOTEROL FUMARATE DIHYDRATE SCH: 200; 5 AEROSOL RESPIRATORY (INHALATION) at 19:23

## 2019-02-26 RX ADMIN — THERA TABS SCH: TAB at 10:55

## 2019-02-26 RX ADMIN — METOPROLOL TARTRATE PRN MG: 5 INJECTION, SOLUTION INTRAVENOUS at 12:29

## 2019-02-26 RX ADMIN — HEPARIN SODIUM SCH UNITS: 5000 INJECTION INTRAVENOUS; SUBCUTANEOUS at 20:58

## 2019-02-26 RX ADMIN — LORAZEPAM SCH MG: 2 INJECTION INTRAMUSCULAR; INTRAVENOUS at 02:56

## 2019-02-26 RX ADMIN — HYDROCHLOROTHIAZIDE SCH MG: 25 TABLET ORAL at 11:50

## 2019-02-26 RX ADMIN — PANTOPRAZOLE SODIUM SCH: 40 TABLET, DELAYED RELEASE ORAL at 10:55

## 2019-02-26 RX ADMIN — LORAZEPAM SCH MG: 2 INJECTION INTRAMUSCULAR; INTRAVENOUS at 15:07

## 2019-02-26 RX ADMIN — ACETAMINOPHEN PRN MG: 650 SUPPOSITORY RECTAL at 13:16

## 2019-02-26 RX ADMIN — HYDROCHLOROTHIAZIDE SCH: 25 TABLET ORAL at 10:55

## 2019-02-26 RX ADMIN — LORAZEPAM SCH MG: 2 INJECTION INTRAMUSCULAR; INTRAVENOUS at 19:07

## 2019-02-26 RX ADMIN — LORAZEPAM SCH MG: 2 INJECTION INTRAMUSCULAR; INTRAVENOUS at 04:56

## 2019-02-26 RX ADMIN — METOPROLOL TARTRATE PRN MG: 5 INJECTION, SOLUTION INTRAVENOUS at 20:58

## 2019-02-26 RX ADMIN — HEPARIN SODIUM SCH UNITS: 5000 INJECTION INTRAVENOUS; SUBCUTANEOUS at 05:00

## 2019-02-26 RX ADMIN — Medication SCH: at 10:55

## 2019-02-26 RX ADMIN — PANTOPRAZOLE SODIUM SCH: 40 TABLET, DELAYED RELEASE ORAL at 20:51

## 2019-02-26 RX ADMIN — MOMETASONE FUROATE AND FORMOTEROL FUMARATE DIHYDRATE SCH: 200; 5 AEROSOL RESPIRATORY (INHALATION) at 08:34

## 2019-02-26 RX ADMIN — TIOTROPIUM BROMIDE SCH: 18 CAPSULE ORAL; RESPIRATORY (INHALATION) at 08:34

## 2019-02-26 RX ADMIN — LORAZEPAM SCH MG: 2 INJECTION INTRAMUSCULAR; INTRAVENOUS at 06:39

## 2019-02-26 RX ADMIN — LORAZEPAM SCH MG: 2 INJECTION INTRAMUSCULAR; INTRAVENOUS at 01:10

## 2019-02-26 RX ADMIN — FERROUS SULFATE TAB 325 MG (65 MG ELEMENTAL FE) SCH: 325 (65 FE) TAB at 10:55

## 2019-02-26 RX ADMIN — FOLIC ACID SCH: 1 TABLET ORAL at 10:55

## 2019-02-26 RX ADMIN — ACETAMINOPHEN PRN MG: 650 SUPPOSITORY RECTAL at 19:07

## 2019-02-26 RX ADMIN — HEPARIN SODIUM SCH UNITS: 5000 INJECTION INTRAVENOUS; SUBCUTANEOUS at 12:28

## 2019-02-26 RX ADMIN — THERA TABS SCH: TAB at 00:59

## 2019-02-26 NOTE — PN
Subjective


Date of Service: 02/26/19


Interval History: 





Per RN, patient was scoring for WAM all night and recevied multiple doses of 

ativan Q2H, today patient is lethargic and agitated when awake. Unable to 

obtain ROS 2/2 AMS.


Family History: Unchanged from Admission


Social History: Unchanged from Admission


Past Medical History: Unchanged from Admission





Objective


Active Medications: 








Acetaminophen (Tylenol Tab*)  650 mg PO Q4H PRN


   PRN Reason: PAIN


Acetaminophen (Tylenol Supp*)  650 mg MA Q4H PRN


   PRN Reason: FEVER


   Last Admin: 02/26/19 13:16 Dose:  650 mg


Albuterol (Ventolin 2.5 Mg/3 Ml Neb.Sol*)  2.5 mg INH Q2H PRN


   PRN Reason: SOB/WHEEZING


Clonidine HCl (Catapres-Tts-2 0.2 Mg  Patch*)  0.2 mg TRANSDERM Q7D [facility]


   Last Admin: 02/26/19 11:06 Dose:  0.2 mg


Ferrous Sulfate (Ferrous Sulfate Tab*)  325 mg PO DAILY [facility]


   Last Admin: 02/26/19 10:55 Dose:  Not Given


Folic Acid (Folvite Tab*)  1 mg PO DAILY [facility]


   Last Admin: 02/26/19 10:55 Dose:  Not Given


Haloperidol (Haldol Tab*)  0.5 mg PO Q4H PRN


   PRN Reason: AGITATION


Haloperidol Lactate (Haldol Inj Iv/Im*)  5 mg IV SLOW PU Q6H PRN


   PRN Reason: AGITATION


   Last Admin: 02/25/19 19:35 Dose:  5 mg


Heparin Sodium (Porcine) (Heparin Vial(*))  5,000 units SUBCUT Q8HR [facility]


   Last Admin: 02/26/19 12:28 Dose:  5,000 units


Hydrochlorothiazide (Hydrodiuril Tab*)  12.5 mg PO DAILY [facility]


   Last Admin: 02/26/19 11:50 Dose:  12.5 mg


Lorazepam (Ativan Inj*)  0 - 6 mg IM .PER [facility] PROTOCOL [facility]; Protocol


   Last Admin: 02/26/19 06:39 Dose:  2 mg


Metoprolol Tartrate (Lopressor Iv*)  5 mg IV Q6H PRN


   PRN Reason: Blood Pressure or tachycardia


   Last Admin: 02/26/19 12:29 Dose:  5 mg


Mometasone Furoate/Formoterol Fumar (Dulera 200/5 Mdi*)  2 puff INH BID [facility]


   Last Admin: 02/26/19 08:34 Dose:  Not Given


Multivitamins/Minerals (Theragran/Minerals Tab*)  1 tab PO DAILY [facility]


   Last Admin: 02/26/19 10:55 Dose:  Not Given


Ondansetron HCl (Zofran Inj*)  4 mg IV Q4H PRN


   PRN Reason: NAUSEA


Pantoprazole Sodium (Protonix Tab*)  40 mg PO BID [facility]


   Last Admin: 02/26/19 10:55 Dose:  Not Given


Thiamine HCl (Vitamin B-1 Tab*)  100 mg PO DAILY [facility]


   Last Admin: 02/26/19 10:55 Dose:  Not Given


Tiotropium Bromide (Spiriva Cap.Inh*)  1 cap INH DAILY [facility]


   Last Admin: 02/26/19 08:34 Dose:  Not Given








 Vital Signs - 8 hr











  02/26/19 02/26/19 02/26/19





  06:35 06:39 08:50


 


Temperature   97.6 F


 


Pulse Rate 107  105


 


Respiratory 20 20 20





Rate   


 


Blood Pressure 146/91  133/81





(mmHg)   


 


O2 Sat by Pulse 91  97





Oximetry   














  02/26/19 02/26/19 02/26/19





  09:19 11:47 13:05


 


Temperature  99.6 F 100.2 F


 


Pulse Rate  121 92


 


Respiratory 16 24 24





Rate   


 


Blood Pressure  149/91 141/88





(mmHg)   


 


O2 Sat by Pulse  97 99





Oximetry   











Oxygen Devices in Use Now: None


Appearance: alert, NAD


Eyes: No Scleral Icterus, PERRLA


Ears/Nose/Mouth/Throat: NL Teeth, Lips, Gums, Clear Oropharnyx, Mucous 

Membranes Moist


Neck: NL Appearance and Movements; NL JVP, Trachea Midline


Respiratory: Symmetrical Chest Expansion and Respiratory Effort, - - no wheeze, 

diminished bases, productive cough


Cardiovascular: NL Sounds; No Murmurs; No JVD, -


Abdominal: NL Sounds; No Tenderness; No Distention


Extremities: No Edema, No Clubbing, Cyanosis


Skin: No Rash or Ulcers, No Nodules or Sclerosis


Neurological: - - confused at baseline with periods of agitation and delerium


Nutrition: Taking PO's


Result Diagrams: 


 02/26/19 07:19





 02/26/19 07:19


Additional Lab and Data: 


 Lab Results

















Assess/Plan/Problems-Billing


Assessment: 





Patient is an 89yo male with a PMH for Dementia, alcoholism, CKD, HTN, COPD, 

who is admitted for falls and acute on chronic subdural hematoma which is 

stable but he is now withdrawing from alcohol. Patient is not capable of making 

his own medical decisions and his wife and surrogate decision maker is 

interested in alcohol detox and abstinence. 





- Patient Problems


(1) Subdural hematoma


Code(s): S06.5X9A - TRAUM SUBDR HEM W LOC OF UNSP DURATION, INIT   SNOMED Code(s

): 399214890


   Comment: 


 - Small, stable on repeat CT brain


 - Appreciate Neurosurgery consult, no intervention required at this time


   





(2) Alcohol withdrawal delirium


Code(s): F10.231 - ALCOHOL DEPENDENCE WITH WITHDRAWAL DELIRIUM   SNOMED Code(s)

: 2961120


   Comment: 


 - Per records, patient has gone through inpatient withdrawal in the past


 - Concern that his AMS is also related to hospitalization and advancing 

dementia


 - Discussed with RN to hold ativan for now until patient more awake


 - Control tachycardia and HTN 2/2 with IV lopressor and continue to monitor


 - If patient is persistently lethargic, will re-image to ensure subdural is 

not evolving


   





(3) Altered mental status


Code(s): R41.82 - ALTERED MENTAL STATUS, UNSPECIFIED   SNOMED Code(s): 570751073


   Comment: 


 - Likely alcohol withdrawal on top of underlying dementia, less likely the 

subdural, as last CT scan showed no advancement   





(4) COPD (chronic obstructive pulmonary disease)


Code(s): J44.9 - CHRONIC OBSTRUCTIVE PULMONARY DISEASE, UNSPECIFIED   SNOMED 

Code(s): 74066974


   Comment: 


 - No signs of exacerbation


 - Continue Dulera, Spiriva, and Albuterol PRN   





(5) DVT prophylaxis


Code(s): DZM5663 -    SNOMED Code(s): 261521250


   Comment: 


 - SCDs only in setting of SDH   


Status and Disposition: 





Inpatient, DC plan for home with VNS when medically stable. SW following.

## 2019-02-27 RX ADMIN — PANTOPRAZOLE SODIUM SCH MG: 40 TABLET, DELAYED RELEASE ORAL at 22:12

## 2019-02-27 RX ADMIN — MOMETASONE FUROATE AND FORMOTEROL FUMARATE DIHYDRATE SCH PUFF: 200; 5 AEROSOL RESPIRATORY (INHALATION) at 19:50

## 2019-02-27 RX ADMIN — LORAZEPAM SCH MG: 2 INJECTION INTRAMUSCULAR; INTRAVENOUS at 23:09

## 2019-02-27 RX ADMIN — FOLIC ACID SCH: 1 TABLET ORAL at 15:13

## 2019-02-27 RX ADMIN — PANTOPRAZOLE SODIUM SCH: 40 TABLET, DELAYED RELEASE ORAL at 15:14

## 2019-02-27 RX ADMIN — SODIUM CHLORIDE SCH MLS/HR: 900 IRRIGANT IRRIGATION at 16:19

## 2019-02-27 RX ADMIN — HEPARIN SODIUM SCH UNITS: 5000 INJECTION INTRAVENOUS; SUBCUTANEOUS at 05:26

## 2019-02-27 RX ADMIN — TIOTROPIUM BROMIDE SCH: 18 CAPSULE ORAL; RESPIRATORY (INHALATION) at 07:45

## 2019-02-27 RX ADMIN — FERROUS SULFATE TAB 325 MG (65 MG ELEMENTAL FE) SCH: 325 (65 FE) TAB at 15:13

## 2019-02-27 RX ADMIN — HYDROCHLOROTHIAZIDE SCH: 25 TABLET ORAL at 15:14

## 2019-02-27 RX ADMIN — THERA TABS SCH: TAB at 15:14

## 2019-02-27 RX ADMIN — Medication SCH: at 15:14

## 2019-02-27 RX ADMIN — HEPARIN SODIUM SCH UNITS: 5000 INJECTION INTRAVENOUS; SUBCUTANEOUS at 16:19

## 2019-02-27 RX ADMIN — HEPARIN SODIUM SCH UNITS: 5000 INJECTION INTRAVENOUS; SUBCUTANEOUS at 22:12

## 2019-02-27 RX ADMIN — MOMETASONE FUROATE AND FORMOTEROL FUMARATE DIHYDRATE SCH: 200; 5 AEROSOL RESPIRATORY (INHALATION) at 07:44

## 2019-02-27 RX ADMIN — LORAZEPAM SCH MG: 2 INJECTION INTRAMUSCULAR; INTRAVENOUS at 03:14

## 2019-02-27 NOTE — PN
Subjective


Date of Service: 19


Interval History: 





Patient seen, unable to obtain ROS 2/2 MAS. Chart reviewed, case discussed with 

RN.


Family History: Unchanged from Admission


Social History: Unchanged from Admission


Past Medical History: Unchanged from Admission





Objective


Active Medications: 








Acetaminophen (Tylenol Tab*)  650 mg PO Q4H PRN


   PRN Reason: PAIN


Acetaminophen (Tylenol Supp*)  650 mg GA Q4H PRN


   PRN Reason: FEVER


   Last Admin: 19 19:07 Dose:  650 mg


Albuterol (Ventolin 2.5 Mg/3 Ml Neb.Sol*)  2.5 mg INH Q2H PRN


   PRN Reason: SOB/WHEEZING


Clonidine HCl (Catapres-Tts-2 0.2 Mg  Patch*)  0.2 mg TRANSDERM Q7D [facility]


   Last Admin: 19 11:06 Dose:  0.2 mg


Ferrous Sulfate (Ferrous Sulfate Tab*)  325 mg PO DAILY [facility]


   Last Admin: 19 10:55 Dose:  Not Given


Folic Acid (Folvite Tab*)  1 mg PO DAILY [facility]


   Last Admin: 19 10:55 Dose:  Not Given


Haloperidol (Haldol Tab*)  0.5 mg PO Q4H PRN


   PRN Reason: AGITATION


Haloperidol Lactate (Haldol Inj Iv/Im*)  5 mg IV SLOW PU Q6H PRN


   PRN Reason: AGITATION


   Last Admin: 19 19:35 Dose:  5 mg


Heparin Sodium (Porcine) (Heparin Vial(*))  5,000 units SUBCUT Q8HR [facility]


   Last Admin: 19 05:26 Dose:  5,000 units


Hydrochlorothiazide (Hydrodiuril Tab*)  12.5 mg PO DAILY [facility]


   Last Admin: 19 11:50 Dose:  12.5 mg


Sodium Chloride (Ns 0.9% 1000 Ml**)  1,000 mls @ 50 mls/hr IV PER RATE [facility]


Lorazepam (Ativan Inj*)  0 - 6 mg IM .PER [facility] PROTOCOL [facility]; Protocol


   Last Admin: 19 03:14 Dose:  2 mg


Metoprolol Tartrate (Lopressor Iv*)  5 mg IV Q6H PRN


   PRN Reason: Blood Pressure or tachycardia


   Last Admin: 19 20:58 Dose:  5 mg


Mometasone Furoate/Formoterol Fumar (Dulera 200/5 Mdi*)  2 puff INH BID [facility]


   Last Admin: 19 07:44 Dose:  Not Given


Multivitamins/Minerals (Theragran/Minerals Tab*)  1 tab PO DAILY [facility]


   Last Admin: 19 10:55 Dose:  Not Given


Ondansetron HCl (Zofran Inj*)  4 mg IV Q4H PRN


   PRN Reason: NAUSEA


Pantoprazole Sodium (Protonix Tab*)  40 mg PO BID [facility]


   Last Admin: 19 20:51 Dose:  Not Given


Thiamine HCl (Vitamin B-1 Tab*)  100 mg PO DAILY [facility]


   Last Admin: 19 10:55 Dose:  Not Given


Tiotropium Bromide (Spiriva Cap.Inh*)  1 cap INH DAILY [facility]


   Last Admin: 19 07:45 Dose:  Not Given








 Vital Signs - 8 hr











  19





  07:00 07:26 09:00


 


Temperature  98.0 F 98.0 F


 


Pulse Rate 69  66


 


Respiratory  22 22





Rate   


 


Blood Pressure  98/69 105/59





(mmHg)   


 


O2 Sat by Pulse  94 93





Oximetry   














  19





  10:59


 


Temperature 97.0 F


 


Pulse Rate 79


 


Respiratory 16





Rate 


 


Blood Pressure 100/54





(mmHg) 


 


O2 Sat by Pulse 88





Oximetry 











Oxygen Devices in Use Now: None


Appearance: lethargic, periods of agitation


Eyes: No Scleral Icterus, PERRLA


Ears/Nose/Mouth/Throat: NL Teeth, Lips, Gums, Mucous Membranes Moist


Neck: NL Appearance and Movements; NL JVP, Trachea Midline


Respiratory: Symmetrical Chest Expansion and Respiratory Effort, Clear to 

Auscultation


Cardiovascular: NL Sounds; No Murmurs; No JVD, RRR, No Edema


Abdominal: NL Sounds; No Tenderness; No Distention


Extremities: No Edema


Skin: No Rash or Ulcers, No Nodules or Sclerosis


Neurological: Alert and Oriented x 3, NL Muscle Strength and Tone


Nutrition: Taking PO's


Result Diagrams: 


 19 07:19





 19 07:19


Additional Lab and Data: 


 Lab Results














Diagnostic Imaging: 





Patient Name:          BOB LYNN                                        

                              Medical Record#: [id number]


Ordering Physician: Pau Way NP                                   

                              Acct.#: [id number]


:     1929           Age: 89   Sex: M                                 

                              Location: [address] [place] [address] MEDICAL


Exam Date: 19 0909                                                       

                              ADM Status: ADM IN





Order Information:                            CT BRAIN WO


Accession Number:                             [id number]


CPT:                                          03160


HISTORY: decreased LOC


COMPARISONS: 2019





TECHNIQUE: Multiple contiguous axial CT scans were obtained of the head without 


intravenous contrast. 





FINDINGS: 


HEMORRHAGE/INFARCT: There is no parenchymal hemorrhage or acute infarct..


MASSES/SHIFT: There is no mass or shift.





EXTRA-AXIAL SPACES: Again noted is an extra axial fluid collection along the 

right


frontoparietal convexity consistent with subacute subdural hematoma. When 

measured at


comparable levels, this is stable from the previous examination.


SULCI AND VENTRICLES: The sulci and ventricles are normal in size and position 

for the


patient's stated age.


CEREBRUM: There are no focal parenchymal abnormalities.


BRAINSTEM: There are no focal parenchymal abnormalities.


CEREBELLUM: There are no focal parenchymal abnormalities.





VESSELS: The vessels are grossly normal.


PARANASAL SINUSES: The paranasal sinuses are clear.


ORBITS: The orbits are unremarkable.


BONES AND SOFT TISSUE: No bone or soft tissue abnormalities are noted.





OTHER: None





IMPRESSION: 


STABLE RIGHT FRONTOPARIETAL SUBDURAL HEMATOMA.














Assess/Plan/Problems-Billing


Assessment: 





Patient is an 88yo male with a PMH for Dementia, alcoholism, CKD, HTN, COPD, 

who is admitted for falls and acute on chronic subdural hematoma which is 

stable but he is now withdrawing from alcohol. Patient is not capable of making 

his own medical decisions and his wife and surrogate decision maker is 

interested in alcohol detox and abstinence. 





- Patient Problems


(1) Subdural hematoma


Code(s): S06.5X9A - TRAUM SUBDR HEM W LOC OF UNSP DURATION, INIT   SNOMED Code(s

): 390319570


   Comment: 


 - CT repeated today 2/2 persistent lethargy, no changes noted


 - Appreciate Neurosurgery consult, no intervention required at this time


   





(2) Alcohol withdrawal delirium


Code(s): F10.231 - ALCOHOL DEPENDENCE WITH WITHDRAWAL DELIRIUM   SNOMED Code(s)

: 5108436


   Comment: 


 - Per records, patient has gone through inpatient withdrawal in the past


 - Concern that his AMS is also related to hospitalization and advancing 

dementia


 - On WAM protocol but concern that ativan is contributing to lethargy as well


 - Control tachycardia and HTN 2/2 with IV lopressor PRN and continue to monitor


   





(3) Altered mental status


Code(s): R41.82 - ALTERED MENTAL STATUS, UNSPECIFIED   SNOMED Code(s): 127540746


   Comment: 


 - Likely alcohol withdrawal on top of underlying dementia, the subdural bleed 

has not changed/evolved   





(4) COPD (chronic obstructive pulmonary disease)


Code(s): J44.9 - CHRONIC OBSTRUCTIVE PULMONARY DISEASE, UNSPECIFIED   SNOMED 

Code(s): 20316359


   Comment: 


 - No signs of exacerbation


 - Continue Dulera, Spiriva, and Albuterol PRN   





(5) DVT prophylaxis


Code(s): GRG2055 -    SNOMED Code(s): 421954206


   Comment: 


 - SCDs only in setting of SDH   


Status and Disposition: 





Inpatient, at this point, patient remains too unstable to discharge. If his AMS 

does not start to improve, will consult neurology for additional recs, although 

I believe the combination of alcohol withdrawal and dementia continues to make 

assessment difficult.

## 2019-02-28 RX ADMIN — HEPARIN SODIUM SCH UNITS: 5000 INJECTION INTRAVENOUS; SUBCUTANEOUS at 22:09

## 2019-02-28 RX ADMIN — MOMETASONE FUROATE AND FORMOTEROL FUMARATE DIHYDRATE SCH: 200; 5 AEROSOL RESPIRATORY (INHALATION) at 07:49

## 2019-02-28 RX ADMIN — HEPARIN SODIUM SCH UNITS: 5000 INJECTION INTRAVENOUS; SUBCUTANEOUS at 05:19

## 2019-02-28 RX ADMIN — Medication SCH: at 08:05

## 2019-02-28 RX ADMIN — HALOPERIDOL LACTATE PRN MG: 5 INJECTION, SOLUTION INTRAMUSCULAR at 17:42

## 2019-02-28 RX ADMIN — THERA TABS SCH: TAB at 08:04

## 2019-02-28 RX ADMIN — PANTOPRAZOLE SODIUM SCH MG: 40 TABLET, DELAYED RELEASE ORAL at 22:01

## 2019-02-28 RX ADMIN — HEPARIN SODIUM SCH UNITS: 5000 INJECTION INTRAVENOUS; SUBCUTANEOUS at 14:43

## 2019-02-28 RX ADMIN — PANTOPRAZOLE SODIUM SCH MG: 40 TABLET, DELAYED RELEASE ORAL at 07:49

## 2019-02-28 RX ADMIN — PANTOPRAZOLE SODIUM SCH: 40 TABLET, DELAYED RELEASE ORAL at 00:44

## 2019-02-28 RX ADMIN — THERA TABS SCH: TAB at 08:06

## 2019-02-28 RX ADMIN — FERROUS SULFATE TAB 325 MG (65 MG ELEMENTAL FE) SCH: 325 (65 FE) TAB at 08:04

## 2019-02-28 RX ADMIN — FOLIC ACID SCH MG: 1 TABLET ORAL at 07:49

## 2019-02-28 RX ADMIN — TIOTROPIUM BROMIDE SCH: 18 CAPSULE ORAL; RESPIRATORY (INHALATION) at 07:49

## 2019-02-28 RX ADMIN — MOMETASONE FUROATE AND FORMOTEROL FUMARATE DIHYDRATE SCH: 200; 5 AEROSOL RESPIRATORY (INHALATION) at 19:48

## 2019-02-28 NOTE — PN
Subjective


Date of Service: 19


Interval History: 





Patient seen and examined. Seems to be responding better today. Remains confused

, but is more alert and can articulate some needs. Unable to obtain reliable 

ROS 2/2 dementia/delerium.


Family History: Unchanged from Admission


Social History: Unchanged from Admission


Past Medical History: Unchanged from Admission





Objective


Active Medications: 








Acetaminophen (Tylenol Tab*)  650 mg PO Q4H PRN


   PRN Reason: PAIN


Acetaminophen (Tylenol Supp*)  650 mg [place] Q4H PRN


   PRN Reason: FEVER


   Last Admin: 19 19:07 Dose:  650 mg


Albuterol (Ventolin 2.5 Mg/3 Ml Neb.Sol*)  2.5 mg INH Q2H PRN


   PRN Reason: SOB/WHEEZING


Ferrous Sulfate (Ferrous Sulfate Tab*)  325 mg PO DAILY [facility]


   Last Admin: 19 08:04 Dose:  Not Given


Folic Acid (Folvite Tab*)  1 mg PO DAILY [facility]


   Last Admin: 19 07:49 Dose:  1 mg


Haloperidol (Haldol Tab*)  0.5 mg PO Q4H PRN


   PRN Reason: AGITATION


Haloperidol Lactate (Haldol Inj Iv/Im*)  5 mg IV SLOW PU Q6H PRN


   PRN Reason: AGITATION


   Last Admin: 19 17:42 Dose:  5 mg


Heparin Sodium (Porcine) (Heparin Vial(*))  5,000 units SUBCUT Q8HR [facility]


   Last Admin: 19 14:43 Dose:  5,000 units


Sodium Chloride (Ns 0.9% 1000 Ml**)  1,000 mls @ 50 mls/hr IV PER RATE [facility]


   Last Admin: 19 16:19 Dose:  50 mls/hr


Lorazepam (Ativan Inj*)  0 - 6 mg IM .PER [facility] PROTOCOL [facility]; Protocol


   Last Admin: 19 23:09 Dose:  2 mg


Metoprolol Tartrate (Lopressor Iv*)  5 mg IV Q6H PRN


   PRN Reason: Blood Pressure or tachycardia


   Last Admin: 19 20:58 Dose:  5 mg


Mometasone Furoate/Formoterol Fumar (Dulera 200/5 Mdi*)  2 puff INH BID [facility]


   Last Admin: 19 07:49 Dose:  Not Given


Multivitamins/Minerals (Theragran/Minerals Tab*)  1 tab PO DAILY [facility]


   Last Admin: 19 08:06 Dose:  Not Given


Ondansetron HCl (Zofran Inj*)  4 mg IV Q4H PRN


   PRN Reason: NAUSEA


Pantoprazole Sodium (Protonix Tab*)  40 mg PO BID [facility]


   Last Admin: 19 07:49 Dose:  40 mg


Thiamine HCl (Vitamin B-1 Tab*)  100 mg PO DAILY [facility]


   Last Admin: 19 08:05 Dose:  Not Given


Tiotropium Bromide (Spiriva Cap.Inh*)  1 cap INH DAILY [facility]


   Last Admin: 19 07:49 Dose:  Not Given








 Vital Signs - 8 hr











  19





  11:08 16:20


 


Temperature 98.1 F 


 


Pulse Rate 87 100


 


Respiratory 18 20





Rate  


 


Blood Pressure 112/60 106/56





(mmHg)  


 


O2 Sat by Pulse 98 99





Oximetry  











Oxygen Devices in Use Now: None


Appearance: alert, NAD


Eyes: No Scleral Icterus, PERRLA


Ears/Nose/Mouth/Throat: NL Teeth, Lips, Gums


Neck: NL Appearance and Movements; NL JVP, Trachea Midline


Respiratory: Symmetrical Chest Expansion and Respiratory Effort, Clear to 

Auscultation


Cardiovascular: NL Sounds; No Murmurs; No JVD, RRR, No Edema


Abdominal: NL Sounds; No Tenderness; No Distention


Extremities: No Edema, No Clubbing, Cyanosis


Skin: No Rash or Ulcers


Neurological: - - confused but improved from yesterday


Nutrition: Taking PO's


Result Diagrams: 


 19 07:19





 19 07:19


Additional Lab and Data: 


 Lab Results














Diagnostic Imaging: 





Patient Name:          BOB LYNN                                        

                              Medical Record#: [id number]


Ordering Physician: Pau Way NP                                   

                              Acct.#: [id number]


:     1929           Age: 89   Sex: M                                 

                              Location: [address] [place] [address] - MEDICAL


Exam Date: 19                                                       

                              ADM Status: ADM IN





Order Information:                            CT BRAIN WO


Accession Number:                             [id number]


CPT:                                          [id number]


HISTORY: decreased LOC


COMPARISONS: 2019





TECHNIQUE: Multiple contiguous axial CT scans were obtained of the head without 


intravenous contrast. 





FINDINGS: 


HEMORRHAGE/INFARCT: There is no parenchymal hemorrhage or acute infarct..


MASSES/SHIFT: There is no mass or shift.





EXTRA-AXIAL SPACES: Again noted is an extra axial fluid collection along the 

right


frontoparietal convexity consistent with subacute subdural hematoma. When 

measured at


comparable levels, this is stable from the previous examination.


SULCI AND VENTRICLES: The sulci and ventricles are normal in size and position 

for the


patient's stated age.


CEREBRUM: There are no focal parenchymal abnormalities.


BRAINSTEM: There are no focal parenchymal abnormalities.


CEREBELLUM: There are no focal parenchymal abnormalities.





VESSELS: The vessels are grossly normal.


PARANASAL SINUSES: The paranasal sinuses are clear.


ORBITS: The orbits are unremarkable.


BONES AND SOFT TISSUE: No bone or soft tissue abnormalities are noted.





OTHER: None





IMPRESSION: 


STABLE RIGHT FRONTOPARIETAL SUBDURAL HEMATOMA.














Assess/Plan/Problems-Billing


Assessment: 





Patient is an 88yo male with a PMH for Dementia, alcoholism, CKD, HTN, COPD, 

who is admitted for falls and acute on chronic subdural hematoma which is 

stable but he is now withdrawing from alcohol. Patient is not capable of making 

his own medical decisions and his wife and surrogate decision maker is 

interested in alcohol detox and abstinence. 





- Patient Problems


(1) Subdural hematoma


Code(s): S06.5X9A - TRAUM SUBDR HEM W LOC OF UNSP DURATION, INIT   SNOMED Code(s

): 057390702


   Comment: 


 - CT repeated yesterdat 2/2 persistent lethargy, no changes noted and 

mentation is improved today


 - Appreciate Neurosurgery consult, no intervention required at this time


   





(2) Alcohol withdrawal delirium


Code(s): F10.231 - ALCOHOL DEPENDENCE WITH WITHDRAWAL DELIRIUM   SNOMED Code(s)

: 9350280


   Comment: 


 - Not scoring on WAM protocol today


 - BP and HR controlled and mentation is better





   





(3) Knee pain, acute


Code(s): M25.569 - PAIN IN UNSPECIFIED KNEE   SNOMED Code(s): 87251676


   Comment: 


 - Per RN, patient has some mild edema to left knee and bruising which is 

likely from his initial fall


 - Xray reveals no fracture but does have calcified cartilage


 - Supportive care, tylenol PRN, warm packs   





(4) Altered mental status


Code(s): R41.82 - ALTERED MENTAL STATUS, UNSPECIFIED   SNOMED Code(s): 301924827


   Comment: 


 - Likely alcohol withdrawal with dementia but almost back to baseline today   





(5) COPD (chronic obstructive pulmonary disease)


Code(s): J44.9 - CHRONIC OBSTRUCTIVE PULMONARY DISEASE, UNSPECIFIED   SNOMED 

Code(s): 19950490


   Comment: 


 - No signs of exacerbation


 - Continue Dulera, Spiriva, and Albuterol PRN   





(6) DVT prophylaxis


Code(s): RVU8889 -    SNOMED Code(s): 755035845


   Comment: 


 - SCDs only in setting of SDH   


Status and Disposition: 





Inpatient, status is improving. DC planning home with VNS versus STR.

## 2019-03-01 RX ADMIN — Medication SCH MG: at 08:49

## 2019-03-01 RX ADMIN — FOLIC ACID SCH MG: 1 TABLET ORAL at 08:49

## 2019-03-01 RX ADMIN — HEPARIN SODIUM SCH UNITS: 5000 INJECTION INTRAVENOUS; SUBCUTANEOUS at 06:17

## 2019-03-01 RX ADMIN — MOMETASONE FUROATE AND FORMOTEROL FUMARATE DIHYDRATE SCH PUFF: 200; 5 AEROSOL RESPIRATORY (INHALATION) at 08:10

## 2019-03-01 RX ADMIN — FERROUS SULFATE TAB 325 MG (65 MG ELEMENTAL FE) SCH MG: 325 (65 FE) TAB at 08:49

## 2019-03-01 RX ADMIN — SODIUM CHLORIDE SCH MLS/HR: 900 IRRIGANT IRRIGATION at 14:08

## 2019-03-01 RX ADMIN — THERA TABS SCH TAB: TAB at 08:49

## 2019-03-01 RX ADMIN — HALOPERIDOL LACTATE PRN MG: 5 INJECTION, SOLUTION INTRAMUSCULAR at 19:45

## 2019-03-01 RX ADMIN — PANTOPRAZOLE SODIUM SCH: 40 TABLET, DELAYED RELEASE ORAL at 22:51

## 2019-03-01 RX ADMIN — HEPARIN SODIUM SCH UNITS: 5000 INJECTION INTRAVENOUS; SUBCUTANEOUS at 14:05

## 2019-03-01 RX ADMIN — TIOTROPIUM BROMIDE SCH CAP: 18 CAPSULE ORAL; RESPIRATORY (INHALATION) at 08:08

## 2019-03-01 RX ADMIN — MOMETASONE FUROATE AND FORMOTEROL FUMARATE DIHYDRATE SCH PUFF: 200; 5 AEROSOL RESPIRATORY (INHALATION) at 19:47

## 2019-03-01 RX ADMIN — PANTOPRAZOLE SODIUM SCH MG: 40 TABLET, DELAYED RELEASE ORAL at 08:49

## 2019-03-01 RX ADMIN — HEPARIN SODIUM SCH: 5000 INJECTION INTRAVENOUS; SUBCUTANEOUS at 22:50

## 2019-03-01 RX ADMIN — HALOPERIDOL LACTATE PRN MG: 5 INJECTION, SOLUTION INTRAMUSCULAR at 02:42

## 2019-03-01 NOTE — PN
Subjective


Date of Service: 19


Interval History: 





Mr. Lynn seen and examined in room. He is OOB to chair, dozing. He is easily 

arousable. He denies pain, difficulty breathing, chest pain, nausea. He states 

he is tired. Not able to contribute much more to the history.





Family History: Unchanged from Admission


Social History: Unchanged from Admission


Past Medical History: Unchanged from Admission





Objective


Active Medications: 








Acetaminophen (Tylenol Tab*)  650 mg PO Q4H PRN


   PRN Reason: PAIN


Acetaminophen (Tylenol Supp*)  650 mg [place] Q4H PRN


   PRN Reason: FEVER


   Last Admin: 19 19:07 Dose:  650 mg


Albuterol (Ventolin 2.5 Mg/3 Ml Neb.Sol*)  2.5 mg INH Q2H PRN


   PRN Reason: SOB/WHEEZING


Ferrous Sulfate (Ferrous Sulfate Tab*)  325 mg PO DAILY [facility]


   Last Admin: 19 08:49 Dose:  325 mg


Folic Acid (Folvite Tab*)  1 mg PO DAILY [facility]


   Last Admin: 19 08:49 Dose:  1 mg


Haloperidol (Haldol Tab*)  0.5 mg PO Q4H PRN


   PRN Reason: AGITATION


Haloperidol Lactate (Haldol Inj Iv/Im*)  5 mg IV SLOW PU Q6H PRN


   PRN Reason: AGITATION


   Last Admin: 19 02:42 Dose:  5 mg


Heparin Sodium (Porcine) (Heparin Vial(*))  5,000 units SUBCUT Q8HR [facility]


   Last Admin: 19 06:17 Dose:  5,000 units


Sodium Chloride (Ns 0.9% 1000 Ml**)  1,000 mls @ 50 mls/hr IV PER RATE [facility]


   Last Admin: 19 16:19 Dose:  50 mls/hr


Lorazepam (Ativan Inj*)  0 - 6 mg IM .PER [facility] PROTOCOL [facility]; Protocol


   Last Admin: 19 23:09 Dose:  2 mg


Metoprolol Tartrate (Lopressor Iv*)  5 mg IV Q6H PRN


   PRN Reason: Blood Pressure or tachycardia


   Last Admin: 19 20:58 Dose:  5 mg


Mometasone Furoate/Formoterol Fumar (Dulera 200/5 Mdi*)  2 puff INH BID [facility]


   Last Admin: 19 08:10 Dose:  2 puff


Multivitamins/Minerals (Theragran/Minerals Tab*)  1 tab PO DAILY [facility]


   Last Admin: 19 08:49 Dose:  1 tab


Ondansetron HCl (Zofran Inj*)  4 mg IV Q4H PRN


   PRN Reason: NAUSEA


Pantoprazole Sodium (Protonix Tab*)  40 mg PO BID [facility]


   Last Admin: 19 08:49 Dose:  40 mg


Thiamine HCl (Vitamin B-1 Tab*)  100 mg PO DAILY [facility]


   Last Admin: 19 08:49 Dose:  100 mg


Tiotropium Bromide (Spiriva Cap.Inh*)  1 cap INH DAILY [facility]


   Last Admin: 19 08:08 Dose:  1 cap








 Vital Signs - 8 hr











  19





  03:00 07:26 08:00


 


Temperature 98.5 F 97.7 F 


 


Pulse Rate 57 113 


 


Respiratory 20 16 16





Rate   


 


Blood Pressure 105/42 143/80 





(mmHg)   


 


O2 Sat by Pulse 61 80 





Oximetry   











Oxygen Devices in Use Now: None


Appearance: Elderly male, OOB to chair, in NAD


Eyes: No Scleral Icterus, PERRLA


Ears/Nose/Mouth/Throat: Clear Oropharnyx, Mucous Membranes Moist


Neck: NL Appearance and Movements; NL JVP


Respiratory: Symmetrical Chest Expansion and Respiratory Effort, Clear to 

Auscultation


Cardiovascular: NL Sounds; No Murmurs; No JVD, RRR, No Edema


Abdominal: NL Sounds; No Tenderness; No Distention


Extremities: No Clubbing, Cyanosis


Skin: - - right forehead laceration with sutures above right eyebrow with 

surrounding ecchymosis


Neurological: - - Alert, able to follow commands, some confusion


Lines/Tubes/Other Access: Clean, Dry and Intact Peripheral IV


Nutrition: Taking PO's


Result Diagrams: 


 19 07:19





 19 07:19


Additional Lab and Data: 


 Lab Results














Diagnostic Imaging: 





Patient Name:          BOB LYNN                                        

                              Medical Record#: [id number]


Ordering Physician: Pau Way NP                                   

                              Acct.#: [id number]


:     1929           Age: 89   Sex: M                                 

                              Location: [address] [place] [address] - MEDICAL


Exam Date: 19 09                                                       

                              ADM Status: ADM IN





Order Information:                            CT BRAIN WO


Accession Number:                             [id number]


CPT:                                          [id number]


HISTORY: decreased LOC


COMPARISONS: 2019





TECHNIQUE: Multiple contiguous axial CT scans were obtained of the head without 


intravenous contrast. 





FINDINGS: 


HEMORRHAGE/INFARCT: There is no parenchymal hemorrhage or acute infarct..


MASSES/SHIFT: There is no mass or shift.





EXTRA-AXIAL SPACES: Again noted is an extra axial fluid collection along the 

right


frontoparietal convexity consistent with subacute subdural hematoma. When 

measured at


comparable levels, this is stable from the previous examination.


SULCI AND VENTRICLES: The sulci and ventricles are normal in size and position 

for the


patient's stated age.


CEREBRUM: There are no focal parenchymal abnormalities.


BRAINSTEM: There are no focal parenchymal abnormalities.


CEREBELLUM: There are no focal parenchymal abnormalities.





VESSELS: The vessels are grossly normal.


PARANASAL SINUSES: The paranasal sinuses are clear.


ORBITS: The orbits are unremarkable.


BONES AND SOFT TISSUE: No bone or soft tissue abnormalities are noted.





OTHER: None





IMPRESSION: 


STABLE RIGHT FRONTOPARIETAL SUBDURAL HEMATOMA.














Assess/Plan/Problems-Billing


Assessment: 





Patient is an 89yo male with a PMH for Dementia, alcoholism, CKD, HTN, COPD, 

who is admitted for falls and acute on chronic subdural hematoma which is 

stable but he is now withdrawing from alcohol. Patient is not capable of making 

his own medical decisions and his wife and surrogate decision maker is 

interested in alcohol detox and abstinence. 





- Patient Problems


(1) Subdural hematoma


Code(s): S06.5X9A - TRAUM SUBDR HEM W LOC OF UNSP DURATION, INIT   Comment: 


 - CT repeated , no changes noted and mentation appears stable


 - Appreciate Neurosurgery consult, no intervention required at this time


   





(2) Alcohol withdrawal delirium


Code(s): F10.231 - ALCOHOL DEPENDENCE WITH WITHDRAWAL DELIRIUM   Comment: 


 - WAM score 2 to 4


 - BP and HR controlled and mentation is better   





(3) Knee pain, acute


Code(s): M25.569 - PAIN IN UNSPECIFIED KNEE   Comment: 


 - Per RN, patient has some mild edema to left knee and bruising which is 

likely from his initial fall


 - Xray reveals no fracture but does have calcified cartilage


 - Supportive care, tylenol PRN, warm packs   





(4) Altered mental status


Code(s): R41.82 - ALTERED MENTAL STATUS, UNSPECIFIED   Comment: 


 - Likely alcohol withdrawal with dementia but returning to baseline   





(5) COPD (chronic obstructive pulmonary disease)


Code(s): J44.9 - CHRONIC OBSTRUCTIVE PULMONARY DISEASE, UNSPECIFIED   Comment: 


 - No signs of exacerbation


 - Continue Dulera, Spiriva, and Albuterol PRN   





(6) DVT prophylaxis


Comment: 


 - SCDs only in setting of SDH   


Status and Disposition: 





Inpatient, status is improving. DC planning home with VNS versus STR. Case 

manager to discuss with wife. 


Attending: Gordon Roe

## 2019-03-02 RX ADMIN — Medication SCH: at 11:27

## 2019-03-02 RX ADMIN — FOLIC ACID SCH: 1 TABLET ORAL at 11:27

## 2019-03-02 RX ADMIN — HEPARIN SODIUM SCH: 5000 INJECTION INTRAVENOUS; SUBCUTANEOUS at 22:03

## 2019-03-02 RX ADMIN — TIOTROPIUM BROMIDE SCH CAP: 18 CAPSULE ORAL; RESPIRATORY (INHALATION) at 10:28

## 2019-03-02 RX ADMIN — THERA TABS SCH: TAB at 11:27

## 2019-03-02 RX ADMIN — MOMETASONE FUROATE AND FORMOTEROL FUMARATE DIHYDRATE SCH PUFF: 200; 5 AEROSOL RESPIRATORY (INHALATION) at 10:28

## 2019-03-02 RX ADMIN — HALOPERIDOL LACTATE PRN MG: 5 INJECTION, SOLUTION INTRAMUSCULAR at 19:11

## 2019-03-02 RX ADMIN — FERROUS SULFATE TAB 325 MG (65 MG ELEMENTAL FE) SCH: 325 (65 FE) TAB at 11:28

## 2019-03-02 RX ADMIN — HEPARIN SODIUM SCH UNITS: 5000 INJECTION INTRAVENOUS; SUBCUTANEOUS at 05:44

## 2019-03-02 RX ADMIN — MOMETASONE FUROATE AND FORMOTEROL FUMARATE DIHYDRATE SCH PUFF: 200; 5 AEROSOL RESPIRATORY (INHALATION) at 19:15

## 2019-03-02 RX ADMIN — PANTOPRAZOLE SODIUM SCH: 40 TABLET, DELAYED RELEASE ORAL at 11:27

## 2019-03-02 RX ADMIN — HEPARIN SODIUM SCH UNITS: 5000 INJECTION INTRAVENOUS; SUBCUTANEOUS at 13:32

## 2019-03-02 RX ADMIN — HALOPERIDOL LACTATE PRN MG: 5 INJECTION, SOLUTION INTRAMUSCULAR at 19:48

## 2019-03-02 RX ADMIN — PANTOPRAZOLE SODIUM SCH: 40 TABLET, DELAYED RELEASE ORAL at 19:56

## 2019-03-02 NOTE — PN
Subjective


Date of Service: 19


Interval History: 





Mr. Lynn seen and examined at bedside. Wife is in the room, accompanying 

patient. It is her desire for him to be referred to [facility] prior to return home; 

she is still awaiting to hear about bed offers. Mr. Lynn is seen ambulating 

with assist and walker in the room. He denies any pain or discomfort. He thinks 

we are at the store. He is otherwise pleasant and cooperative.





Family History: Unchanged from Admission


Social History: Unchanged from Admission


Past Medical History: Unchanged from Admission





Objective


Active Medications: 








Acetaminophen (Tylenol Tab*)  650 mg PO Q4H PRN


   PRN Reason: PAIN


Acetaminophen (Tylenol Supp*)  650 mg [place] Q4H PRN


   PRN Reason: FEVER


   Last Admin: 19 19:07 Dose:  650 mg


Albuterol (Ventolin 2.5 Mg/3 Ml Neb.Sol*)  2.5 mg INH Q2H PRN


   PRN Reason: SOB/WHEEZING


Docusate Sodium (Colace Cap*)  100 mg PO BID PRN


   PRN Reason: CONSTIPATION


   Last Admin: 19 14:05 Dose:  100 mg


Ferrous Sulfate (Ferrous Sulfate Tab*)  325 mg PO DAILY [facility]


   Last Admin: 19 11:28 Dose:  Not Given


Folic Acid (Folvite Tab*)  1 mg PO DAILY [facility]


   Last Admin: 19 11:27 Dose:  Not Given


Haloperidol (Haldol Tab*)  0.5 mg PO Q4H PRN


   PRN Reason: AGITATION


Haloperidol Lactate (Haldol Inj Iv/Im*)  5 mg IV SLOW PU Q6H PRN


   PRN Reason: AGITATION


   Last Admin: 19 19:45 Dose:  5 mg


Heparin Sodium (Porcine) (Heparin Vial(*))  5,000 units SUBCUT Q8HR [facility]


   Last Admin: 19 05:44 Dose:  5,000 units


Sodium Chloride (Ns 0.9% 1000 Ml**)  1,000 mls @ 50 mls/hr IV PER RATE [facility]


   Last Admin: 19 14:08 Dose:  50 mls/hr


Lorazepam (Ativan Inj*)  0 - 6 mg IM .PER [facility] PROTOCOL [facility]; Protocol


   Last Admin: 19 23:09 Dose:  2 mg


Lorazepam (Ativan Inj*)  1 mg IV PUSH Q6H PRN


   PRN Reason: AGITATION


   Last Admin: 19 05:51 Dose:  1 mg


Magnesium Hydroxide (Milk Of Magnesia Liq*)  30 ml PO Q4H PRN


   PRN Reason: CONSTIPATION


Metoprolol Tartrate (Lopressor Iv*)  5 mg IV Q6H PRN


   PRN Reason: Blood Pressure or tachycardia


   Last Admin: 19 20:58 Dose:  5 mg


Mometasone Furoate/Formoterol Fumar (Dulera 200/5 Mdi*)  2 puff INH BID [facility]


   Last Admin: 19 10:28 Dose:  2 puff


Multivitamins/Minerals (Theragran/Minerals Tab*)  1 tab PO DAILY [facility]


   Last Admin: 19 11:27 Dose:  Not Given


Ondansetron HCl (Zofran Inj*)  4 mg IV Q4H PRN


   PRN Reason: NAUSEA


Pantoprazole Sodium (Protonix Tab*)  40 mg PO BID [facility]


   Last Admin: 19 11:27 Dose:  Not Given


Senna (Senokot Tab*)  1 tab PO BEDTIME PRN


   PRN Reason: CONSTIPATION


Thiamine HCl (Vitamin B-1 Tab*)  100 mg PO DAILY [facility]


   Last Admin: 19 11:27 Dose:  Not Given


Tiotropium Bromide (Spiriva Cap.Inh*)  1 cap INH DAILY [facility]


   Last Admin: 19 10:28 Dose:  1 cap








 Vital Signs - 8 hr











  19





  05:51 07:35 08:00


 


Temperature   


 


Pulse Rate   


 


Respiratory 18 18 18





Rate   


 


Blood Pressure   





(mmHg)   


 


O2 Sat by Pulse   





Oximetry   














  19





  09:41 10:32


 


Temperature 97.5 F 


 


Pulse Rate 29 93


 


Respiratory 18 16





Rate  


 


Blood Pressure 119/56 





(mmHg)  


 


O2 Sat by Pulse 94 93





Oximetry  











Oxygen Devices in Use Now: None


Appearance: Elderly male, OOB to chair, NAD


Eyes: No Scleral Icterus, PERRLA


Ears/Nose/Mouth/Throat: Clear Oropharnyx, Mucous Membranes Moist


Neck: NL Appearance and Movements; NL JVP


Respiratory: Symmetrical Chest Expansion and Respiratory Effort, Clear to 

Auscultation


Cardiovascular: NL Sounds; No Murmurs; No JVD, RRR


Abdominal: NL Sounds; No Tenderness; No Distention


Extremities: No Edema, No Clubbing, Cyanosis


Skin: - - laceration above right eyebrow and laceration to left elbow - both 

with sutures in place


Neurological: NL Muscle Strength and Tone, - - Alert, oriented to self, 

disoriented to time/place. Impulsive.


Lines/Tubes/Other Access: Clean, Dry and Intact Peripheral IV


Nutrition: Taking PO's


Result Diagrams: 


 19 07:19





 19 07:19


Additional Lab and Data: 


 Lab Results














Diagnostic Imaging: 





Patient Name:          BOB LYNN                                        

                              Medical Record#: [id number]


Ordering Physician: Pau Way NP                                   

                              Acct.#: [id number]


:     1929           Age: 89   Sex: M                                 

                              Location: [address] [place] [address] - MEDICAL


Exam Date: 19                                                       

                              ADM Status: ADM IN





Order Information:                            CT BRAIN WO


Accession Number:                             [id number]


CPT:                                          [id number]


HISTORY: decreased LOC


COMPARISONS: 2019





TECHNIQUE: Multiple contiguous axial CT scans were obtained of the head without 


intravenous contrast. 





FINDINGS: 


HEMORRHAGE/INFARCT: There is no parenchymal hemorrhage or acute infarct..


MASSES/SHIFT: There is no mass or shift.





EXTRA-AXIAL SPACES: Again noted is an extra axial fluid collection along the 

right


frontoparietal convexity consistent with subacute subdural hematoma. When 

measured at


comparable levels, this is stable from the previous examination.


SULCI AND VENTRICLES: The sulci and ventricles are normal in size and position 

for the


patient's stated age.


CEREBRUM: There are no focal parenchymal abnormalities.


BRAINSTEM: There are no focal parenchymal abnormalities.


CEREBELLUM: There are no focal parenchymal abnormalities.





VESSELS: The vessels are grossly normal.


PARANASAL SINUSES: The paranasal sinuses are clear.


ORBITS: The orbits are unremarkable.


BONES AND SOFT TISSUE: No bone or soft tissue abnormalities are noted.





OTHER: None





IMPRESSION: 


STABLE RIGHT FRONTOPARIETAL SUBDURAL HEMATOMA.














Assess/Plan/Problems-Billing


Assessment: 





Patient is an 89yo male with a PMH for Dementia, alcoholism, CKD, HTN, COPD, 

who is admitted for falls and acute on chronic subdural hematoma which is 

stable but he is now withdrawing from alcohol. Patient is not capable of making 

his own medical decisions and his wife and surrogate decision maker is 

interested in alcohol detox and abstinence. 





- Patient Problems


(1) Subdural hematoma


Code(s): S06.5X9A - TRAUM SUBDR HEM W LOC OF UNSP DURATION, INIT   Comment: 


 - CT repeated , no changes noted and mentation appears stable


 - Appreciate Neurosurgery consult, no intervention required at this time   





(2) Alcohol withdrawal delirium


Code(s): F10.231 - ALCOHOL DEPENDENCE WITH WITHDRAWAL DELIRIUM   Comment: 


 - WAM monitoring now q8h, still occasionally scoring, which may be due to 

agitation/impulsive behavior from AMS


 - BP and HR controlled and mentation is better   





(3) Laceration


Comment: 


With right forehead laceration with sutures and left elbow laceration


Forehead sutures due for removal this weekend


Left elbow sutures (#2) due for removal 10-14 days after placement, which will 

be around 3/6/19.   





(4) Knee pain, acute


Code(s): M25.569 - PAIN IN UNSPECIFIED KNEE   Comment: 


 - Per RN, patient has some mild edema to left knee and bruising which is 

likely from his initial fall


 - Xray reveals no fracture but does have calcified cartilage


 - Supportive care, tylenol PRN, warm packs   





(5) Altered mental status


Code(s): R41.82 - ALTERED MENTAL STATUS, UNSPECIFIED   Comment: 


 - Likely alcohol withdrawal with dementia but returning to baseline   





(6) COPD (chronic obstructive pulmonary disease)


Code(s): J44.9 - CHRONIC OBSTRUCTIVE PULMONARY DISEASE, UNSPECIFIED   Comment: 


 - No signs of exacerbation


 - Continue Dulera, Spiriva, and Albuterol PRN   





(7) DVT prophylaxis


Comment: 


 - SCDs only in setting of SDH   


Status and Disposition: 





Inpatient, status is improving. DC planning home, awaiting bed offer for [facility]. 


Attending: Gordon Roe

## 2019-03-03 RX ADMIN — HEPARIN SODIUM SCH UNITS: 5000 INJECTION INTRAVENOUS; SUBCUTANEOUS at 20:46

## 2019-03-03 RX ADMIN — HEPARIN SODIUM SCH UNITS: 5000 INJECTION INTRAVENOUS; SUBCUTANEOUS at 13:34

## 2019-03-03 RX ADMIN — PANTOPRAZOLE SODIUM SCH MG: 40 TABLET, DELAYED RELEASE ORAL at 20:46

## 2019-03-03 RX ADMIN — LORAZEPAM PRN MG: 1 TABLET ORAL at 13:57

## 2019-03-03 RX ADMIN — TIOTROPIUM BROMIDE SCH CAP: 18 CAPSULE ORAL; RESPIRATORY (INHALATION) at 07:55

## 2019-03-03 RX ADMIN — Medication SCH MG: at 08:20

## 2019-03-03 RX ADMIN — ACETAMINOPHEN PRN MG: 325 TABLET ORAL at 03:06

## 2019-03-03 RX ADMIN — LORAZEPAM PRN MG: 1 TABLET ORAL at 20:54

## 2019-03-03 RX ADMIN — HEPARIN SODIUM SCH UNITS: 5000 INJECTION INTRAVENOUS; SUBCUTANEOUS at 05:54

## 2019-03-03 RX ADMIN — THERA TABS SCH: TAB at 08:23

## 2019-03-03 RX ADMIN — FERROUS SULFATE TAB 325 MG (65 MG ELEMENTAL FE) SCH MG: 325 (65 FE) TAB at 08:21

## 2019-03-03 RX ADMIN — FOLIC ACID SCH MG: 1 TABLET ORAL at 08:20

## 2019-03-03 RX ADMIN — MOMETASONE FUROATE AND FORMOTEROL FUMARATE DIHYDRATE SCH PUFF: 200; 5 AEROSOL RESPIRATORY (INHALATION) at 07:55

## 2019-03-03 RX ADMIN — PANTOPRAZOLE SODIUM SCH MG: 40 TABLET, DELAYED RELEASE ORAL at 08:19

## 2019-03-03 RX ADMIN — MOMETASONE FUROATE AND FORMOTEROL FUMARATE DIHYDRATE SCH PUFF: 200; 5 AEROSOL RESPIRATORY (INHALATION) at 19:45

## 2019-03-03 RX ADMIN — HALOPERIDOL LACTATE PRN MG: 5 INJECTION, SOLUTION INTRAMUSCULAR at 17:22

## 2019-03-03 NOTE — PN
Subjective


Date of Service: 19


Interval History: 





No overnight events.  Patient pleasantly confused.  C/O left foot pain.  States 

he has had gout in the past.  Not able to answer most questions appropriately. 


Family History: Unchanged from Admission


Social History: Unchanged from Admission


Past Medical History: Unchanged from Admission





Objective


Active Medications: 








Acetaminophen (Tylenol Tab*)  650 mg PO Q4H PRN


   PRN Reason: PAIN


   Last Admin: 19 03:06 Dose:  650 mg


Acetaminophen (Tylenol Supp*)  650 mg OK Q4H PRN


   PRN Reason: FEVER


   Last Admin: 19 19:07 Dose:  650 mg


Albuterol (Ventolin 2.5 Mg/3 Ml Neb.Sol*)  2.5 mg INH Q2H PRN


   PRN Reason: SOB/WHEEZING


Colchicine (Colcrys*)  1.2 mg PO ONCE ONE


   Stop: 19 16:01


Docusate Sodium (Colace Cap*)  100 mg PO BID PRN


   PRN Reason: CONSTIPATION


   Last Admin: 19 14:05 Dose:  100 mg


Ferrous Sulfate (Ferrous Sulfate Tab*)  325 mg PO DAILY [facility]


   Last Admin: 19 08:21 Dose:  325 mg


Folic Acid (Folvite Tab*)  1 mg PO DAILY [facility]


   Last Admin: 19 08:20 Dose:  1 mg


Haloperidol (Haldol Tab*)  0.5 mg PO Q4H PRN


   PRN Reason: AGITATION


Haloperidol Lactate (Haldol Inj Iv/Im*)  5 mg IM Q6H PRN


   PRN Reason: AGITATION


   Last Admin: 19 19:48 Dose:  5 mg


Heparin Sodium (Porcine) (Heparin Vial(*))  5,000 units SUBCUT Q8HR [facility]


   Last Admin: 19 13:34 Dose:  5,000 units


Lorazepam (Ativan Inj*)  0 - 6 mg IM .PER [facility] PROTOCOL [facility]; Protocol


   Last Admin: 19 23:09 Dose:  2 mg


Lorazepam (Ativan Tab(*))  1 mg PO Q6H PRN


   PRN Reason: AGITATION


   Last Admin: 19 13:57 Dose:  1 mg


Magnesium Hydroxide (Milk Of Magnesia Liq*)  30 ml PO Q4H PRN


   PRN Reason: CONSTIPATION


Metoprolol Tartrate (Lopressor Iv*)  5 mg IV Q6H PRN


   PRN Reason: Blood Pressure or tachycardia


   Last Admin: 19 20:58 Dose:  5 mg


Mometasone Furoate/Formoterol Fumar (Dulera 200/5 Mdi*)  2 puff INH BID [facility]


   Last Admin: 19 07:55 Dose:  2 puff


Multivitamins/Minerals (Theragran/Minerals Tab*)  1 tab PO DAILY [facility]


   Last Admin: 19 08:23 Dose:  Not Given


Ondansetron HCl (Zofran Inj*)  4 mg IV Q4H PRN


   PRN Reason: NAUSEA


Pantoprazole Sodium (Protonix Tab*)  40 mg PO BID [facility]


   Last Admin: 19 08:19 Dose:  40 mg


Senna (Senokot Tab*)  1 tab PO BEDTIME PRN


   PRN Reason: CONSTIPATION


Thiamine HCl (Vitamin B-1 Tab*)  100 mg PO DAILY [facility]


   Last Admin: 19 08:20 Dose:  100 mg


Tiotropium Bromide (Spiriva Cap.Inh*)  1 cap INH DAILY [facility]


   Last Admin: 19 07:55 Dose:  1 cap








 Vital Signs - 8 hr











  19





  07:35 07:59 10:32


 


Temperature 97.6 F  


 


Pulse Rate 92 90 


 


Respiratory 18 16 18





Rate   


 


Blood Pressure 118/62  





(mmHg)   


 


O2 Sat by Pulse 97 95 





Oximetry   














  19





  11:49 13:57


 


Temperature 98.2 F 


 


Pulse Rate 101 


 


Respiratory 16 20





Rate  


 


Blood Pressure 110/66 





(mmHg)  


 


O2 Sat by Pulse 95 





Oximetry  











Oxygen Devices in Use Now: None


Ears/Nose/Mouth/Throat: Mucous Membranes Moist


Respiratory: Symmetrical Chest Expansion and Respiratory Effort, Clear to 

Auscultation


Cardiovascular: RRR, - - soft systolic murmur present 


Abdominal: NL Sounds; No Tenderness; No Distention, No Hepatosplenomegaly


Extremities: No Edema, - - left medial ankle with more prominent bony protrusion

, with erythema and tenderness to palpation


Neurological: - - AAOx1 - oriented to self only.  No gross deficits.  

Pleasantly confused 


Result Diagrams: 


 19 07:19





 19 07:19


Additional Lab and Data: 


 Lab Results














Diagnostic Imaging: 





Patient Name:          BOB LYNN                                        

                              Medical Record#: [id number]


Ordering Physician: Pau Way NP                                   

                              Acct.#: [id number]


:     1929           Age: 89   Sex: M                                 

                              Location: [address] [place] [address] - MEDICAL


Exam Date: 19                                                       

                              ADM Status: ADM IN





Order Information:                            CT BRAIN WO


Accession Number:                             [id number]


CPT:                                          [id number]


HISTORY: decreased LOC


COMPARISONS: 2019





TECHNIQUE: Multiple contiguous axial CT scans were obtained of the head without 


intravenous contrast. 





FINDINGS: 


HEMORRHAGE/INFARCT: There is no parenchymal hemorrhage or acute infarct..


MASSES/SHIFT: There is no mass or shift.





EXTRA-AXIAL SPACES: Again noted is an extra axial fluid collection along the 

right


frontoparietal convexity consistent with subacute subdural hematoma. When 

measured at


comparable levels, this is stable from the previous examination.


SULCI AND VENTRICLES: The sulci and ventricles are normal in size and position 

for the


patient's stated age.


CEREBRUM: There are no focal parenchymal abnormalities.


BRAINSTEM: There are no focal parenchymal abnormalities.


CEREBELLUM: There are no focal parenchymal abnormalities.





VESSELS: The vessels are grossly normal.


PARANASAL SINUSES: The paranasal sinuses are clear.


ORBITS: The orbits are unremarkable.


BONES AND SOFT TISSUE: No bone or soft tissue abnormalities are noted.





OTHER: None





IMPRESSION: 


STABLE RIGHT FRONTOPARIETAL SUBDURAL HEMATOMA.














Assess/Plan/Problems-Billing


Assessment: 





Patient is an 89yo male with a PMHx of dementia, alcohol abuse who presented 

from home c/o falls found to have an acute on chronic subdural hematoma.  

Patient lacks capacity and plan is for [facility] for him. 





- Patient Problems


(1) Foot pain, left


Current Visit: Yes   Status: Acute   Code(s): M79.672 - PAIN IN LEFT FOOT   

SNOMED Code(s): 76335452


   Comment: A. C/O left foot pain 


Appears to be gout 


Will check xray and start colchicine 


   





(2) Knee pain, acute


Current Visit: Yes   Status: Acute   Code(s): M25.569 - PAIN IN UNSPECIFIED 

KNEE   SNOMED Code(s): 54314410


   Comment: A. 


Xray: negative 


No longer c/o pain 


Monitor 


    





(3) Laceration


Current Visit: Yes   Status: Acute   Code(s): VIH9322 -    SNOMED Code(s): 

522713913


   Comment: A. Sutures removed on 3/3 from forehead without issues 


Left elbow sutures due for removal 10-14 days after placement, which will be 

around 3/6/19.   





(4) Subdural hematoma


Current Visit: Yes   Status: Acute   Code(s): S06.5X9A - TRAUM SUBDR HEM W LOC 

OF UNSP DURATION, INIT   SNOMED Code(s): 697062504


   Comment: 


 - CT repeated , no changes noted and mentation appears stable


 - Appreciate Neurosurgery consult, no intervention required at this time   





(5) Alcohol withdrawal delirium


Current Visit: No   Status: Acute   Priority: High   Code(s): F10.231 - ALCOHOL 

DEPENDENCE WITH WITHDRAWAL DELIRIUM   SNOMED Code(s): 3799655


   Comment: A. No longer scoring on WAM.  Will d/c 


Is pleasantly confused today.    





(6) Alcoholism /alcohol abuse


Current Visit: No   Status: Acute   Code(s): F10.20 - ALCOHOL DEPENDENCE, 

UNCOMPLICATED   SNOMED Code(s): 5981068


   Comment: 


- SW consult ordered


- Wife is interested in patient being abstinent from alcohol and he lacks 

capacity to understand the health ramifications at this stage in his dementia


Patient to go to [facility]hab 


He lacks capacity    





(7) Altered mental status


Current Visit: No   Status: Acute   Code(s): R41.82 - ALTERED MENTAL STATUS, 

UNSPECIFIED   SNOMED Code(s): 458737014


   Comment: A.  Patient pulled out IVs and has had issues with agitation during 

his hospitalization 


Currently calm 


He lacks capacity.  Will keep IV out for now. IM haldol prn 


May benefit from seroquel at bedtime if he continues to have agitation in 

evening.  Only required haldol last evening on 3/2.    





(8) DVT prophylaxis


Current Visit: No   Status: Acute   Code(s): MCE0886 -    SNOMED Code(s): 

334838277


   Comment: 


 - SCDs only in setting of SDH   





(9) COPD (chronic obstructive pulmonary disease)


Current Visit: No   Status: Chronic   Code(s): J44.9 - CHRONIC OBSTRUCTIVE 

PULMONARY DISEASE, UNSPECIFIED   SNOMED Code(s): 26161150


   Comment: 


 - No signs of exacerbation


 - Continue Dulera, Spiriva, and Albuterol PRN   


Status and Disposition: 





Inpatient, status is improving. awaiting bed offer for [facility].

## 2019-03-04 RX ADMIN — HEPARIN SODIUM SCH UNITS: 5000 INJECTION INTRAVENOUS; SUBCUTANEOUS at 05:48

## 2019-03-04 RX ADMIN — TIOTROPIUM BROMIDE SCH: 18 CAPSULE ORAL; RESPIRATORY (INHALATION) at 08:13

## 2019-03-04 RX ADMIN — HEPARIN SODIUM SCH UNITS: 5000 INJECTION INTRAVENOUS; SUBCUTANEOUS at 15:16

## 2019-03-04 RX ADMIN — THERA TABS SCH TAB: TAB at 09:18

## 2019-03-04 RX ADMIN — PANTOPRAZOLE SODIUM SCH MG: 40 TABLET, DELAYED RELEASE ORAL at 09:18

## 2019-03-04 RX ADMIN — MOMETASONE FUROATE AND FORMOTEROL FUMARATE DIHYDRATE SCH: 200; 5 AEROSOL RESPIRATORY (INHALATION) at 08:13

## 2019-03-04 RX ADMIN — FOLIC ACID SCH MG: 1 TABLET ORAL at 09:18

## 2019-03-04 RX ADMIN — HEPARIN SODIUM SCH UNITS: 5000 INJECTION INTRAVENOUS; SUBCUTANEOUS at 20:55

## 2019-03-04 RX ADMIN — PANTOPRAZOLE SODIUM SCH MG: 40 TABLET, DELAYED RELEASE ORAL at 20:55

## 2019-03-04 RX ADMIN — FERROUS SULFATE TAB 325 MG (65 MG ELEMENTAL FE) SCH MG: 325 (65 FE) TAB at 09:18

## 2019-03-04 RX ADMIN — COLCHICINE SCH MG: 0.6 TABLET, FILM COATED ORAL at 09:18

## 2019-03-04 RX ADMIN — HALOPERIDOL LACTATE PRN MG: 5 INJECTION, SOLUTION INTRAMUSCULAR at 02:03

## 2019-03-04 RX ADMIN — MOMETASONE FUROATE AND FORMOTEROL FUMARATE DIHYDRATE SCH PUFF: 200; 5 AEROSOL RESPIRATORY (INHALATION) at 19:26

## 2019-03-04 RX ADMIN — Medication SCH MG: at 09:18

## 2019-03-04 NOTE — PN
Subjective


Date of Service: 19


Interval History: 





Patient seen and examined at bedside. Denies fever, chills, shortness of breath

, chest discomfort, N/V/D. 





Mrs. Lynn reports that he continues to have a cough with greenish discharge 

from his right eye. He also has a history of sinus infections and she is 

concerned that he may have another sinus infection. 








Family History: Unchanged from Admission


Social History: Unchanged from Admission


Past Medical History: Unchanged from Admission





Objective


Active Medications: 





Acetaminophen (Tylenol Tab*)  650 mg PO Q4H PRN Reason: PAIN


Acetaminophen (Tylenol Supp*)  650 mg SC Q4H PRN Reason: FEVER


Albuterol (Ventolin 2.5 Mg/3 Ml Neb.Sol*)  2.5 mg INH Q2H PRN Reason: SOB/

WHEEZING


Colchicine (Colcrys*)  0.6 mg PO DAILY [facility]


   Stop: 19 08:59


Docusate Sodium (Colace Cap*)  100 mg PO BID PRN Reason: CONSTIPATION


Ferrous Sulfate (Ferrous Sulfate Tab*)  325 mg PO DAILY [facility]


Folic Acid (Folvite Tab*)  1 mg PO DAILY [facility]


Haloperidol (Haldol Tab*)  0.5 mg PO Q4H PRN Reason: AGITATION


Haloperidol Lactate (Haldol Inj Iv/Im*)  5 mg IM Q6H PRN Reason: AGITATION


Heparin Sodium (Porcine) (Heparin Vial(*))  5,000 units SUBCUT Q8HR [facility]


Lorazepam (Ativan Inj*)  0 - 6 mg IM .PER [facility] PROTOCOL [facility]; Protocol


Lorazepam (Ativan Tab(*))  1 mg PO Q6H PRN Reason: AGITATION


Magnesium Hydroxide (Milk Of Magnesia Liq*)  30 ml PO Q4H PRN Reason: 

CONSTIPATION


Metoprolol Tartrate (Lopressor Iv*)  5 mg IV Q6H PRN Reason: Blood Pressure or 

tachycardia


Mometasone Furoate/Formoterol Fumar (Dulera 200/5 Mdi*)  2 puff INH BID [facility]


Multivitamins/Minerals (Theragran/Minerals Tab*)  1 tab PO DAILY [facility]


Ondansetron HCl (Zofran Inj*)  4 mg IV Q4H PRN Reason: NAUSEA


Pantoprazole Sodium (Protonix Tab*)  40 mg PO BID [facility]


Senna (Senokot Tab*)  1 tab PO BEDTIME PRN Reason: CONSTIPATION


Thiamine HCl (Vitamin B-1 Tab*)  100 mg PO DAILY [facility]


Tiotropium Bromide (Spiriva Cap.Inh*)  1 cap INH DAILY [facility]





 Vital Signs - 8 hr











  19





  11:09 15:42 16:20


 


Temperature 97.9 F 97.6 F 


 


Pulse Rate 28 125 95


 


Respiratory 18 18 





Rate   


 


Blood Pressure 104/63 130/55 





(mmHg)   


 


O2 Sat by Pulse 96 94 





Oximetry   











Oxygen Devices in Use Now: None


Appearance: NAD, sitting up in a chair


Eyes: - - Mild green crusting to the right eye


Ears/Nose/Mouth/Throat: Mucous Membranes Moist, - - White postnasal drip noted


Respiratory: Symmetrical Chest Expansion and Respiratory Effort, Clear to 

Auscultation - , there was initially rhonchi in the upper air way that cleared 

after a cough


Cardiovascular: RRR


Abdominal: NL Sounds; No Tenderness; No Distention


Extremities: No Edema


Skin: - - Right 2nd toe with erythema, healing lac above right eye


Neurological: - - Alert and Oriented to Person


Nutrition: Taking PO's


Result Diagrams: 


 19 07:19





 19 07:19


Additional Lab and Data: 


 Lab Results














Diagnostic Imaging: 





Patient Name:          BOB LYNN                                        

                              Medical Record#: [id number]


Ordering Physician: Pau Way NP                                   

                              Acct.#: [id number]


:     1929           Age: 89   Sex: M                                 

                              Location: [address] [place] [address] MEDICAL


Exam Date: 19                                                       

                              ADM Status: ADM IN





Order Information:                            CT BRAIN WO


Accession Number:                             [id number]


CPT:                                          [id number]


HISTORY: decreased LOC


COMPARISONS: 2019





TECHNIQUE: Multiple contiguous axial CT scans were obtained of the head without 


intravenous contrast. 





FINDINGS: 


HEMORRHAGE/INFARCT: There is no parenchymal hemorrhage or acute infarct..


MASSES/SHIFT: There is no mass or shift.





EXTRA-AXIAL SPACES: Again noted is an extra axial fluid collection along the 

right


frontoparietal convexity consistent with subacute subdural hematoma. When 

measured at


comparable levels, this is stable from the previous examination.


SULCI AND VENTRICLES: The sulci and ventricles are normal in size and position 

for the


patient's stated age.


CEREBRUM: There are no focal parenchymal abnormalities.


BRAINSTEM: There are no focal parenchymal abnormalities.


CEREBELLUM: There are no focal parenchymal abnormalities.





VESSELS: The vessels are grossly normal.


PARANASAL SINUSES: The paranasal sinuses are clear.


ORBITS: The orbits are unremarkable.


BONES AND SOFT TISSUE: No bone or soft tissue abnormalities are noted.





OTHER: None





IMPRESSION: 


STABLE RIGHT FRONTOPARIETAL SUBDURAL HEMATOMA.














Assess/Plan/Problems-Billing


Assessment: 





Mr. Lynn is an 88yo male with a PMHx of dementia, alcohol abuse, CKD stage 3, 

HTN, and COPD who presented from home c/o falls found to have an acute on 

chronic subdural hematoma.  Patient lacks capacity and plan is for [facility] for him. 








- Patient Problems


(1) URI (upper respiratory infection)


Code(s): J06.9 - ACUTE UPPER RESPIRATORY INFECTION, UNSPECIFIED   SNOMED Code(s)

: 39679720


   Comment: 


- Green crusting to right eye and postnasal drip


- No sinus tenderness


- Afebrile and no leukocytosis


- Suspect this is viral


- No antibiotics at this time, continue supportive care (encourage fluids and 

warm compresses)   





(2) Foot pain, left


Code(s): M79.672 - PAIN IN LEFT FOOT   SNOMED Code(s): 74260959


   Comment: 


- No complaints today 


- Suspect this is gout


- Continue colchicine 


   





(3) Knee pain, acute


Code(s): M25.569 - PAIN IN UNSPECIFIED KNEE   SNOMED Code(s): 65985834


   Comment: 


- Resolved


- Negative xray


    





(4) Laceration


Code(s): MWK4819 -    SNOMED Code(s): 691537845


   Comment: 


- Sutures removed on 3/3 from forehead without issues 


- Left elbow sutures due for removal 10-14 days after placement, which will be 

around 3/6/19.   





(5) Subdural hematoma


Code(s): S06.5X9A - TRAUM SUBDR HEM W LOC OF UNSP DURATION, INIT   SNOMED Code(s

): 573890522


   Comment: 


- Head CT repeated , no changes noted and mentation appears stable


- Appreciate Neurosurgery consult, no intervention required at this time


- Continue neuro checks   





(6) Alcohol withdrawal delirium


Code(s): F10.231 - ALCOHOL DEPENDENCE WITH WITHDRAWAL DELIRIUM   SNOMED Code(s)

: 3258331


   Comment: 


- CAS DC'd


- Pleasantly confused today   





(7) Alcoholism /alcohol abuse


Code(s): F10.20 - ALCOHOL DEPENDENCE, UNCOMPLICATED   SNOMED Code(s): 0023186


   Comment: 


- SW consult ordered


- Wife is interested in patient being abstinent from alcohol and he lacks 

capacity to understand the health ramifications at this stage in his dementia


- Patient to go to [facility]hab, He lacks capacity    





(8) Altered mental status


Code(s): R41.82 - ALTERED MENTAL STATUS, UNSPECIFIED   SNOMED Code(s): 121942057


   Comment: 


- Suspect secondary to dementia and alcohol withdrawal


- Continue haldol prn 


- Required haldol once daily, may benefit from seroquel at bedtime if he 

continues to have agitation in evening   





(9) COPD (chronic obstructive pulmonary disease)


Code(s): J44.9 - CHRONIC OBSTRUCTIVE PULMONARY DISEASE, UNSPECIFIED   SNOMED 

Code(s): 28912637


   Comment: 


- No signs of exacerbation


- Continue Dulera, Spiriva, and Albuterol PRN   





(10) DVT prophylaxis


Code(s): HTZ5465 -    SNOMED Code(s): 543445123


   Comment: 


 - SCDs only in setting of SDH   





(11) Full code status


Code(s): Z78.9 - OTHER SPECIFIED HEALTH STATUS   SNOMED Code(s): 923855268


   


Status and Disposition: 





Inpatient, status is improving. Awaiting bed offer for [facility].

## 2019-03-05 RX ADMIN — THERA TABS SCH TAB: TAB at 08:21

## 2019-03-05 RX ADMIN — FOLIC ACID SCH: 1 TABLET ORAL at 08:29

## 2019-03-05 RX ADMIN — Medication SCH MG: at 08:21

## 2019-03-05 RX ADMIN — FERROUS SULFATE TAB 325 MG (65 MG ELEMENTAL FE) SCH MG: 325 (65 FE) TAB at 08:21

## 2019-03-05 RX ADMIN — HEPARIN SODIUM SCH UNITS: 5000 INJECTION INTRAVENOUS; SUBCUTANEOUS at 06:01

## 2019-03-05 RX ADMIN — COLCHICINE SCH MG: 0.6 TABLET, FILM COATED ORAL at 08:21

## 2019-03-05 RX ADMIN — LORAZEPAM PRN MG: 1 TABLET ORAL at 01:49

## 2019-03-05 RX ADMIN — HEPARIN SODIUM SCH UNITS: 5000 INJECTION INTRAVENOUS; SUBCUTANEOUS at 21:30

## 2019-03-05 RX ADMIN — TIOTROPIUM BROMIDE SCH CAP: 18 CAPSULE ORAL; RESPIRATORY (INHALATION) at 09:25

## 2019-03-05 RX ADMIN — PANTOPRAZOLE SODIUM SCH: 40 TABLET, DELAYED RELEASE ORAL at 08:29

## 2019-03-05 RX ADMIN — HEPARIN SODIUM SCH UNITS: 5000 INJECTION INTRAVENOUS; SUBCUTANEOUS at 14:14

## 2019-03-05 RX ADMIN — MOMETASONE FUROATE AND FORMOTEROL FUMARATE DIHYDRATE SCH: 200; 5 AEROSOL RESPIRATORY (INHALATION) at 20:09

## 2019-03-05 RX ADMIN — FOLIC ACID SCH MG: 1 TABLET ORAL at 08:21

## 2019-03-05 RX ADMIN — PANTOPRAZOLE SODIUM SCH MG: 40 TABLET, DELAYED RELEASE ORAL at 21:30

## 2019-03-05 RX ADMIN — MOMETASONE FUROATE AND FORMOTEROL FUMARATE DIHYDRATE SCH PUFF: 200; 5 AEROSOL RESPIRATORY (INHALATION) at 09:24

## 2019-03-05 RX ADMIN — PANTOPRAZOLE SODIUM SCH MG: 40 TABLET, DELAYED RELEASE ORAL at 08:21

## 2019-03-05 NOTE — PN
Subjective


Date of Service: 19


Interval History: 





Patient seen and examined at bedside. Denies fever, chills, shortness of breath

, chest discomfort, N/V/D.








Family History: Unchanged from Admission


Social History: Unchanged from Admission


Past Medical History: Unchanged from Admission





Objective


Active Medications: 





Acetaminophen (Tylenol Tab*)  650 mg PO Q4H PRN Reason: PAIN


Acetaminophen (Tylenol Supp*)  650 mg [place] Q4H PRN Reason: FEVER


Albuterol (Ventolin 2.5 Mg/3 Ml Neb.Sol*)  2.5 mg INH Q2H PRN Reason: SOB/

WHEEZING


Colchicine (Colcrys*)  0.6 mg PO DAILY [facility]


   Stop: 19 08:59


Docusate Sodium (Colace Cap*)  100 mg PO BID PRN Reason: CONSTIPATION


Ferrous Sulfate (Ferrous Sulfate Tab*)  325 mg PO DAILY [facility]


Folic Acid (Folvite Tab*)  1 mg PO DAILY [facility]


Haloperidol (Haldol Tab*)  0.5 mg PO Q4H PRN Reason: AGITATION


Haloperidol Lactate (Haldol Inj Iv/Im*)  5 mg IM Q6H PRN Reason: AGITATION


Heparin Sodium (Porcine) (Heparin Vial(*))  5,000 units SUBCUT Q8HR [facility]


Lorazepam (Ativan Tab(*))  1 mg PO Q6H PRN Reason: AGITATION


Magnesium Hydroxide (Milk Of Magnesia Liq*)  30 ml PO Q4H PRN Reason: 

CONSTIPATION


Metoprolol Tartrate (Lopressor Iv*)  5 mg IV Q6H PRN Reason: Blood Pressure or 

tachycardia


Mometasone Furoate/Formoterol Fumar (Dulera 200/5 Mdi*)  2 puff INH BID [facility]


Multivitamins/Minerals (Theragran/Minerals Tab*)  1 tab PO DAILY [facility]


Ondansetron HCl (Zofran Inj*)  4 mg IV Q4H PRN Reason: NAUSEA


Pantoprazole Sodium (Protonix Tab*)  40 mg PO BID [facility]


Senna (Senokot Tab*)  1 tab PO BEDTIME PRN Reason: CONSTIPATION


Thiamine HCl (Vitamin B-1 Tab*)  100 mg PO DAILY [facility]


Tiotropium Bromide (Spiriva Cap.Inh*)  1 cap INH DAILY [facility]





 Vital Signs - 8 hr











  19





  05:57 07:16 09:21


 


Temperature  99.1 F 


 


Pulse Rate  119 


 


Respiratory 17 20 18





Rate   


 


Blood Pressure  148/78 





(mmHg)   


 


O2 Sat by Pulse  95 





Oximetry   














  19





  12:15


 


Temperature 97.9 F


 


Pulse Rate 121


 


Respiratory 16





Rate 


 


Blood Pressure 154/93





(mmHg) 


 


O2 Sat by Pulse 99





Oximetry 











Oxygen Devices in Use Now: None


Appearance: NAD, sitting up in bed


Eyes: - - Mild green crusting to the right eye


Ears/Nose/Mouth/Throat: Mucous Membranes Moist


Respiratory: Symmetrical Chest Expansion and Respiratory Effort, Clear to 

Auscultation


Cardiovascular: NL Sounds; No Murmurs; No JVD, RRR


Abdominal: NL Sounds; No Tenderness; No Distention


Extremities: No Edema


Skin: - - Right 2nd toe with erythema


Neurological: Alert and Oriented x 3, NL Muscle Strength and Tone


Nutrition: Taking PO's


Result Diagrams: 


 19 07:19





 19 07:19


Diagnostic Imagin/27/19 0909  CT BRAIN WO


IMPRESSION: 


STABLE RIGHT FRONTOPARIETAL SUBDURAL HEMATOMA.














Assess/Plan/Problems-Billing


Assessment: 





Mr. Payne is an 88yo male with a PMHx of dementia, alcohol abuse, CKD stage 3, 

HTN, and COPD who presented from home c/o falls found to have an acute on 

chronic subdural hematoma.  Patient lacks capacity and plan is for [facility] for him. 








- Patient Problems


(1) URI (upper respiratory infection)


Code(s): J06.9 - ACUTE UPPER RESPIRATORY INFECTION, UNSPECIFIED   SNOMED Code(s)

: 27318422


   Comment: 


- Green crusting to right eye and postnasal drip


- No sinus tenderness


- Afebrile and no leukocytosis


- Suspect this is viral


- No antibiotics at this time, continue supportive care (encourage fluids and 

warm compresses)   





(2) Foot pain, left


Code(s): M79.672 - PAIN IN LEFT FOOT   SNOMED Code(s): 75402343


   Comment: 


- No complaints today 


- Suspect this is gout


- Continue colchicine 


   





(3) Knee pain, acute


Code(s): M25.569 - PAIN IN UNSPECIFIED KNEE   SNOMED Code(s): 62559922


   Comment: 


- Resolved


- Negative xray


    





(4) Laceration


Code(s): BRH2522 -    SNOMED Code(s): 195848337


   Comment: 


- Sutures removed on 3/3 from forehead without issues 


- Left elbow sutures due for removal 10-14 days after placement, which will be 

around 3/6/19.   





(5) Subdural hematoma


Code(s): S06.5X9A - TRAUM SUBDR HEM W LOC OF UNSP DURATION, INIT   SNOMED Code(s

): 861649128


   Comment: 


- Head CT repeated , no changes noted and mentation appears stable


- Appreciate Neurosurgery consult, no intervention required at this time


- Continue neuro checks   





(6) Alcohol withdrawal delirium


Code(s): F10.231 - ALCOHOL DEPENDENCE WITH WITHDRAWAL DELIRIUM   SNOMED Code(s)

: 7502933


   Comment: 


- WAM DC'd


- Pleasantly confused today   





(7) Alcoholism /alcohol abuse


Code(s): F10.20 - ALCOHOL DEPENDENCE, UNCOMPLICATED   SNOMED Code(s): 9947267


   Comment: 


- SW consult ordered


- Wife is interested in patient being abstinent from alcohol and he lacks 

capacity to understand the health ramifications at this stage in his dementia


- Patient to go to [facility]hab, He lacks capacity    





(8) Altered mental status


Code(s): R41.82 - ALTERED MENTAL STATUS, UNSPECIFIED   SNOMED Code(s): 550039808


   Comment: 


- Suspect secondary to dementia and alcohol withdrawal


- Continue haldol prn 


- Required haldol once daily, may benefit from seroquel at bedtime if he 

continues to have agitation in evening   





(9) COPD (chronic obstructive pulmonary disease)


Code(s): J44.9 - CHRONIC OBSTRUCTIVE PULMONARY DISEASE, UNSPECIFIED   SNOMED 

Code(s): 90940392


   Comment: 


- No signs of exacerbation


- Continue Dulera, Spiriva, and Albuterol PRN   





(10) DVT prophylaxis


Code(s): FNE2967 -    SNOMED Code(s): 376625949


   Comment: 


 - SCDs only in setting of SDH   





(11) Full code status


Code(s): Z78.9 - OTHER SPECIFIED HEALTH STATUS   SNOMED Code(s): 128252767


   


Status and Disposition: 





Inpatient, status is improving. Awaiting bed offer for [facility]. 





Attending: Tanner Negrete

## 2019-03-06 LAB
ANION GAP SERPL CALC-SCNC: 8 MMOL/L (ref 2–11)
BASOPHILS # BLD AUTO: 0.1 10^3/UL (ref 0–0.2)
BUN SERPL-MCNC: 46 MG/DL (ref 6–24)
BUN/CREAT SERPL: 28.9 (ref 8–20)
CALCIUM SERPL-MCNC: 8.5 MG/DL (ref 8.6–10.3)
CHLORIDE SERPL-SCNC: 110 MMOL/L (ref 101–111)
EOSINOPHIL # BLD AUTO: 0.1 10^3/UL (ref 0–0.6)
GLUCOSE SERPL-MCNC: 113 MG/DL (ref 70–100)
HCO3 SERPL-SCNC: 25 MMOL/L (ref 22–32)
HCT VFR BLD AUTO: 32 % (ref 42–52)
HGB BLD-MCNC: 10.5 G/DL (ref 14–18)
LYMPHOCYTES # BLD AUTO: 0.6 10^3/UL (ref 1–4.8)
MCH RBC QN AUTO: 32 PG (ref 27–31)
MCHC RBC AUTO-ENTMCNC: 33 G/DL (ref 31–36)
MCV RBC AUTO: 95 FL (ref 80–94)
MONOCYTES # BLD AUTO: 0.7 10^3/UL (ref 0–0.8)
NEUTROPHILS # BLD AUTO: 7.8 10^3/UL (ref 1.5–7.7)
NRBC # BLD AUTO: 0 10^3/UL
NRBC BLD QL AUTO: 0
PLATELET # BLD AUTO: 387 10^3/UL (ref 150–450)
POTASSIUM SERPL-SCNC: 3.7 MMOL/L (ref 3.5–5)
RBC # BLD AUTO: 3.34 10^6/UL (ref 4–5.4)
SODIUM SERPL-SCNC: 143 MMOL/L (ref 135–145)
URATE SERPL-MCNC: 7.6 MG/DL (ref 4.4–7.6)
WBC # BLD AUTO: 9.3 10^3/UL (ref 3.5–10.8)

## 2019-03-06 RX ADMIN — MOMETASONE FUROATE AND FORMOTEROL FUMARATE DIHYDRATE SCH PUFF: 200; 5 AEROSOL RESPIRATORY (INHALATION) at 07:49

## 2019-03-06 RX ADMIN — FOLIC ACID SCH MG: 1 TABLET ORAL at 10:39

## 2019-03-06 RX ADMIN — Medication SCH MG: at 10:41

## 2019-03-06 RX ADMIN — MOMETASONE FUROATE AND FORMOTEROL FUMARATE DIHYDRATE SCH PUFF: 200; 5 AEROSOL RESPIRATORY (INHALATION) at 19:41

## 2019-03-06 RX ADMIN — HEPARIN SODIUM SCH UNITS: 5000 INJECTION INTRAVENOUS; SUBCUTANEOUS at 14:32

## 2019-03-06 RX ADMIN — HEPARIN SODIUM SCH UNITS: 5000 INJECTION INTRAVENOUS; SUBCUTANEOUS at 22:11

## 2019-03-06 RX ADMIN — THERA TABS SCH TAB: TAB at 10:42

## 2019-03-06 RX ADMIN — TIOTROPIUM BROMIDE SCH CAP: 18 CAPSULE ORAL; RESPIRATORY (INHALATION) at 07:49

## 2019-03-06 RX ADMIN — HEPARIN SODIUM SCH UNITS: 5000 INJECTION INTRAVENOUS; SUBCUTANEOUS at 05:35

## 2019-03-06 RX ADMIN — PANTOPRAZOLE SODIUM SCH MG: 40 TABLET, DELAYED RELEASE ORAL at 10:41

## 2019-03-06 RX ADMIN — SODIUM CHLORIDE SCH MLS/HR: 900 IRRIGANT IRRIGATION at 18:21

## 2019-03-06 RX ADMIN — ACETAMINOPHEN PRN MG: 325 TABLET ORAL at 10:39

## 2019-03-06 RX ADMIN — PANTOPRAZOLE SODIUM SCH MG: 40 TABLET, DELAYED RELEASE ORAL at 20:22

## 2019-03-06 RX ADMIN — FERROUS SULFATE TAB 325 MG (65 MG ELEMENTAL FE) SCH MG: 325 (65 FE) TAB at 10:41

## 2019-03-06 NOTE — PN
Subjective


Date of Service: 19


Interval History: 





c/o left foot, great toe and ankle pain with movement and touch.  c/o back  

pain.  Denies chest pain or shortness of breath.  denies abd pain n/v/d.  


Family History: Unchanged from Admission


Social History: Unchanged from Admission


Past Medical History: Unchanged from Admission





Objective


Active Medications: 








Acetaminophen (Tylenol Tab*)  650 mg PO Q4H PRN


   PRN Reason: PAIN


   Last Admin: 19 03:06 Dose:  650 mg


Acetaminophen (Tylenol Supp*)  650 mg KS Q4H PRN


   PRN Reason: FEVER


   Last Admin: 19 19:07 Dose:  650 mg


Albuterol (Ventolin 2.5 Mg/3 Ml Neb.Sol*)  2.5 mg INH Q2H PRN


   PRN Reason: SOB/WHEEZING


Docusate Sodium (Colace Cap*)  100 mg PO BID PRN


   PRN Reason: CONSTIPATION


   Last Admin: 19 14:05 Dose:  100 mg


Ferrous Sulfate (Ferrous Sulfate Tab*)  325 mg PO DAILY [facility]


   Last Admin: 19 08:21 Dose:  325 mg


Folic Acid (Folvite Tab*)  1 mg PO DAILY [facility]


   Last Admin: 19 08:29 Dose:  Not Given


Haloperidol (Haldol Tab*)  0.5 mg PO Q4H PRN


   PRN Reason: AGITATION


Haloperidol Lactate (Haldol Inj Iv/Im*)  5 mg IM Q6H PRN


   PRN Reason: AGITATION


   Last Admin: 19 02:03 Dose:  5 mg


Heparin Sodium (Porcine) (Heparin Vial(*))  5,000 units SUBCUT Q8HR [facility]


   Last Admin: 19 05:35 Dose:  5,000 units


Lorazepam (Ativan Tab(*))  1 mg PO Q6H PRN


   PRN Reason: AGITATION


   Last Admin: 19 01:49 Dose:  1 mg


Magnesium Hydroxide (Milk Of Magnesia Liq*)  30 ml PO Q4H PRN


   PRN Reason: CONSTIPATION


Metoprolol Tartrate (Lopressor Iv*)  5 mg IV Q6H PRN


   PRN Reason: Blood Pressure or tachycardia


   Last Admin: 19 20:58 Dose:  5 mg


Mometasone Furoate/Formoterol Fumar (Dulera 200/5 Mdi*)  2 puff INH BID [facility]


   Last Admin: 19 07:49 Dose:  2 puff


Multivitamins/Minerals (Theragran/Minerals Tab*)  1 tab PO DAILY [facility]


   Last Admin: 19 08:21 Dose:  1 tab


Ondansetron HCl (Zofran Inj*)  4 mg IV Q4H PRN


   PRN Reason: NAUSEA


Pantoprazole Sodium (Protonix Tab*)  40 mg PO BID [facility]


   Last Admin: 19 21:30 Dose:  40 mg


Senna (Senokot Tab*)  1 tab PO BEDTIME PRN


   PRN Reason: CONSTIPATION


Thiamine HCl (Vitamin B-1 Tab*)  100 mg PO DAILY [facility]


   Last Admin: 19 08:21 Dose:  100 mg


Tiotropium Bromide (Spiriva Cap.Inh*)  1 cap INH DAILY [facility]


   Last Admin: 19 07:49 Dose:  1 cap








 Vital Signs - 8 hr











  19





  07:50


 


Pulse Rate 102


 


Respiratory 16





Rate 


 


O2 Sat by Pulse 98





Oximetry 











Oxygen Devices in Use Now: Nasal Cannula


Eyes: No Scleral Icterus


Neck: NL Appearance and Movements; NL JVP, Trachea Midline


Respiratory: Symmetrical Chest Expansion and Respiratory Effort, - - diminshed t

/o bilat 


Cardiovascular: NL Sounds; No Murmurs; No JVD, No Edema


Abdominal: NL Sounds; No Tenderness; No Distention


Extremities: No Clubbing, Cyanosis, - - left foot and ankle with swelling and 

erythema, left great toe with swellinga t the base,  swelling noted to medial 

aspect of the left foot.  


Skin: - - scabbed skin tears noted to right elbow 


Nutrition: Taking PO's


Result Diagrams: 


 19 07:19





 19 07:19


Additional Lab and Data: 


 Lab Results














Diagnostic Imagin/27/19 0909  CT BRAIN WO


IMPRESSION: 


STABLE RIGHT FRONTOPARIETAL SUBDURAL HEMATOMA.














Assess/Plan/Problems-Billing


Assessment: 





Mr. Payne is an 88yo male with a PMHx of dementia, alcohol abuse, CKD stage 3, 

HTN, and COPD who presented from home c/o falls found to have an acute on 

chronic subdural hematoma.  Patient lacks capacity and plan is for [facility] for him. 








- Patient Problems


(1) Altered mental status


Current Visit: Yes   Status: Acute   Code(s): R41.82 - ALTERED MENTAL STATUS, 

UNSPECIFIED   SNOMED Code(s): 131939700


   Comment: 


- Suspect secondary to dementia and alcohol withdrawal


- Continue haldol prn 


- Required haldol once daily, may benefit from seroquel at bedtime if he 

continues to have agitation in evening   





(2) Alcohol withdrawal delirium


Current Visit: Yes   Status: Acute   Priority: High   Code(s): F10.231 - 

ALCOHOL DEPENDENCE WITH WITHDRAWAL DELIRIUM   SNOMED Code(s): 6671254


   Comment: 


- CAS DC'd


- Pleasantly confused today   





(3) Foot pain, left


Current Visit: Yes   Status: Acute   Code(s): M79.672 - PAIN IN LEFT FOOT   

SNOMED Code(s): 40660275


   Comment: 


- c/o left foot pain to touch, swelling and redness noted, left great toe with 

swelling 


- Suspect this is gout


- Continue colchicine 


   





(4) Subdural hematoma


Current Visit: Yes   Status: Acute   Code(s): S06.5X9A - TRAUM SUBDR HEM W LOC 

OF UNSP DURATION, INIT   SNOMED Code(s): 897847394


   Comment: 


- Head CT repeated , no changes noted and mentation appears stable


- Appreciate Neurosurgery consult, no intervention required at this time


- Continue neuro checks   





(5) COPD (chronic obstructive pulmonary disease)


Current Visit: No   Status: Chronic   Code(s): J44.9 - CHRONIC OBSTRUCTIVE 

PULMONARY DISEASE, UNSPECIFIED   SNOMED Code(s): 81262603


   Comment: 


- No signs of exacerbation


- Continue Dulera, Spiriva, and Albuterol PRN   





(6) Laceration


Current Visit: Yes   Status: Acute   Code(s): OBT9877 -    SNOMED Code(s): 

319100196


   Comment: 


- Sutures removed on 3/3 from forehead without issues 


- Left elbow sutures due for removal 10-14 days after placement, which will be 

around 3/6/19.   





(7) DVT prophylaxis


Current Visit: No   Status: Acute   Code(s): PFJ7952 -    SNOMED Code(s): 

234152972


   Comment: 


 - SCDs only in setting of SDH   





(8) Full code status


Current Visit: Yes   Status: Acute   Code(s): Z78.9 - OTHER SPECIFIED HEALTH 

STATUS   SNOMED Code(s): 648142897


   


Status and Disposition: 





Inpatient, status is improving. Awaiting bed offer for [facility].

## 2019-03-07 LAB
ANION GAP SERPL CALC-SCNC: 5 MMOL/L (ref 2–11)
BASOPHILS # BLD AUTO: 0 10^3/UL (ref 0–0.2)
BUN SERPL-MCNC: 37 MG/DL (ref 6–24)
BUN/CREAT SERPL: 26.8 (ref 8–20)
CALCIUM SERPL-MCNC: 8.6 MG/DL (ref 8.6–10.3)
CHLORIDE SERPL-SCNC: 113 MMOL/L (ref 101–111)
EOSINOPHIL # BLD AUTO: 0.1 10^3/UL (ref 0–0.6)
GLUCOSE SERPL-MCNC: 102 MG/DL (ref 70–100)
HCO3 SERPL-SCNC: 28 MMOL/L (ref 22–32)
HCT VFR BLD AUTO: 33 % (ref 42–52)
HGB BLD-MCNC: 11.1 G/DL (ref 14–18)
LYMPHOCYTES # BLD AUTO: 0.6 10^3/UL (ref 1–4.8)
MCH RBC QN AUTO: 32 PG (ref 27–31)
MCHC RBC AUTO-ENTMCNC: 33 G/DL (ref 31–36)
MCV RBC AUTO: 95 FL (ref 80–94)
MONOCYTES # BLD AUTO: 0.6 10^3/UL (ref 0–0.8)
NEUTROPHILS # BLD AUTO: 8.7 10^3/UL (ref 1.5–7.7)
NRBC # BLD AUTO: 0 10^3/UL
NRBC BLD QL AUTO: 0
PLATELET # BLD AUTO: 414 10^3/UL (ref 150–450)
POTASSIUM SERPL-SCNC: 3.9 MMOL/L (ref 3.5–5)
RBC # BLD AUTO: 3.48 10^6/UL (ref 4–5.4)
SODIUM SERPL-SCNC: 146 MMOL/L (ref 135–145)
WBC # BLD AUTO: 10 10^3/UL (ref 3.5–10.8)

## 2019-03-07 RX ADMIN — HEPARIN SODIUM SCH UNITS: 5000 INJECTION INTRAVENOUS; SUBCUTANEOUS at 14:37

## 2019-03-07 RX ADMIN — MOMETASONE FUROATE AND FORMOTEROL FUMARATE DIHYDRATE SCH: 200; 5 AEROSOL RESPIRATORY (INHALATION) at 21:37

## 2019-03-07 RX ADMIN — FOLIC ACID SCH MG: 1 TABLET ORAL at 08:45

## 2019-03-07 RX ADMIN — PANTOPRAZOLE SODIUM SCH MG: 40 TABLET, DELAYED RELEASE ORAL at 21:20

## 2019-03-07 RX ADMIN — TIOTROPIUM BROMIDE SCH CAP: 18 CAPSULE ORAL; RESPIRATORY (INHALATION) at 08:11

## 2019-03-07 RX ADMIN — MOMETASONE FUROATE AND FORMOTEROL FUMARATE DIHYDRATE SCH PUFF: 200; 5 AEROSOL RESPIRATORY (INHALATION) at 08:11

## 2019-03-07 RX ADMIN — PREDNISONE SCH MG: 10 TABLET ORAL at 21:21

## 2019-03-07 RX ADMIN — HEPARIN SODIUM SCH UNITS: 5000 INJECTION INTRAVENOUS; SUBCUTANEOUS at 23:09

## 2019-03-07 RX ADMIN — PANTOPRAZOLE SODIUM SCH MG: 40 TABLET, DELAYED RELEASE ORAL at 08:45

## 2019-03-07 RX ADMIN — THERA TABS SCH TAB: TAB at 08:45

## 2019-03-07 RX ADMIN — HEPARIN SODIUM SCH UNITS: 5000 INJECTION INTRAVENOUS; SUBCUTANEOUS at 05:32

## 2019-03-07 RX ADMIN — Medication SCH MG: at 08:45

## 2019-03-07 RX ADMIN — FERROUS SULFATE TAB 325 MG (65 MG ELEMENTAL FE) SCH MG: 325 (65 FE) TAB at 08:45

## 2019-03-07 RX ADMIN — SODIUM CHLORIDE SCH MLS/HR: 900 IRRIGANT IRRIGATION at 17:43

## 2019-03-07 RX ADMIN — SODIUM CHLORIDE SCH MLS/HR: 900 IRRIGANT IRRIGATION at 05:12

## 2019-03-07 NOTE — PN
Progress Note





- Progress Note


Date of Service: 03/07/19


Note: 





Paged for unwittness fall - found to have a bloody nose.  Neuro checks 

unremarkable.  Will get Head CT to rule out bleed.  Continue neuro checks.

## 2019-03-07 NOTE — PN
Subjective


Date of Service: 19


Interval History: 





patient more alert today.  states that pain in his left foot is better.  

continues to c/o pain with palpation and movement.  denies chest pain or 

shortness of breath.  reports occasional cough. 


confused to place, time and situation.  mumbling words at times , responses do 

not correlate with questions.  





Family History: Unchanged from Admission


Social History: Unchanged from Admission


Past Medical History: Unchanged from Admission





Objective


Active Medications: 








Acetaminophen (Tylenol Tab*)  650 mg PO Q4H PRN


   PRN Reason: PAIN


   Last Admin: 19 10:39 Dose:  650 mg


Acetaminophen (Tylenol Supp*)  650 mg [place] Q4H PRN


   PRN Reason: FEVER


   Last Admin: 19 19:07 Dose:  650 mg


Albuterol (Ventolin 2.5 Mg/3 Ml Neb.Sol*)  2.5 mg INH Q2H PRN


   PRN Reason: SOB/WHEEZING


Docusate Sodium (Colace Cap*)  100 mg PO BID PRN


   PRN Reason: CONSTIPATION


   Last Admin: 19 14:05 Dose:  100 mg


Ferrous Sulfate (Ferrous Sulfate Tab*)  325 mg PO DAILY [facility]


   Last Admin: 19 08:45 Dose:  325 mg


Folic Acid (Folvite Tab*)  1 mg PO DAILY [facility]


   Last Admin: 19 08:45 Dose:  1 mg


Haloperidol (Haldol Tab*)  0.5 mg PO Q4H PRN


   PRN Reason: AGITATION


Haloperidol Lactate (Haldol Inj Iv/Im*)  5 mg IM Q6H PRN


   PRN Reason: AGITATION


   Last Admin: 19 02:03 Dose:  5 mg


Heparin Sodium (Porcine) (Heparin Vial(*))  5,000 units SUBCUT Q8HR [facility]


   Last Admin: 19 14:37 Dose:  5,000 units


Lorazepam (Ativan Tab(*))  1 mg PO Q6H PRN


   PRN Reason: AGITATION


   Last Admin: 19 01:49 Dose:  1 mg


Magnesium Hydroxide (Milk Of Magnesia Liq*)  30 ml PO Q4H PRN


   PRN Reason: CONSTIPATION


Metoprolol Tartrate (Lopressor Iv*)  5 mg IV Q6H PRN


   PRN Reason: Blood Pressure or tachycardia


   Last Admin: 19 20:58 Dose:  5 mg


Mometasone Furoate/Formoterol Fumar (Dulera 200/5 Mdi*)  2 puff INH BID [facility]


   Last Admin: 19 08:11 Dose:  2 puff


Multivitamins/Minerals (Theragran/Minerals Tab*)  1 tab PO DAILY [facility]


   Last Admin: 19 08:45 Dose:  1 tab


Ondansetron HCl (Zofran Inj*)  4 mg IV Q4H PRN


   PRN Reason: NAUSEA


Pantoprazole Sodium (Protonix Tab*)  40 mg PO BID [facility]


   Last Admin: 19 08:45 Dose:  40 mg


Prednisone (Deltasone Tab*)  30 mg PO DAILY [facility]


Senna (Senokot Tab*)  1 tab PO BEDTIME PRN


   PRN Reason: CONSTIPATION


Thiamine HCl (Vitamin B-1 Tab*)  100 mg PO DAILY [facility]


   Last Admin: 19 08:45 Dose:  100 mg


Tiotropium Bromide (Spiriva Cap.Inh*)  1 cap INH DAILY [facility]


   Last Admin: 19 08:11 Dose:  1 cap








 Vital Signs - 8 hr











  19





  11:09 13:51 15:41


 


Temperature 98.1 F  97.7 F


 


Pulse Rate 106 95 98


 


Respiratory 22  20





Rate   


 


Blood Pressure 120/47 110/67 117/66





(mmHg)   


 


O2 Sat by Pulse 94  97





Oximetry   











Oxygen Devices in Use Now: Nasal Cannula


Appearance: elderly male ,resting in bed, confused to place, time and situation


Eyes: No Scleral Icterus


Ears/Nose/Mouth/Throat: Clear Oropharnyx, Mucous Membranes Moist


Neck: NL Appearance and Movements; NL JVP, Trachea Midline


Respiratory: Symmetrical Chest Expansion and Respiratory Effort, - - diminshed t

/o bilat 


Cardiovascular: NL Sounds; No Murmurs; No JVD, No Edema


Abdominal: NL Sounds; No Tenderness; No Distention


Extremities: No Edema, No Clubbing, Cyanosis


Skin: No Rash or Ulcers


Neurological: Alert and Oriented x 3


Nutrition: Taking PO's


Result Diagrams: 


 19 06:31





 19 06:31


Additional Lab and Data: 


 Lab Results














Diagnostic Imagin/27/19 0909  CT BRAIN WO


IMPRESSION: 


STABLE RIGHT FRONTOPARIETAL SUBDURAL HEMATOMA.














Assess/Plan/Problems-Billing


Assessment: 





Mr. Payne is an 89yo male with a PMHx of dementia, alcohol abuse, CKD stage 3, 

HTN, and COPD who presented from home c/o falls found to have an acute on 

chronic subdural hematoma.  Patient lacks capacity and plan is for [facility] for him. 








- Patient Problems


(1) Altered mental status


Current Visit: Yes   Status: Acute   Code(s): R41.82 - ALTERED MENTAL STATUS, 

UNSPECIFIED   SNOMED Code(s): 357394168


   Comment: 


- Suspect secondary to dementia and alcohol withdrawal


- Continue haldol prn 


- may benefit from seroquel at bedtime if he continues to have agitation in 

evening   





(2) Alcohol withdrawal delirium


Current Visit: Yes   Status: Acute   Priority: High   Code(s): F10.231 - 

ALCOHOL DEPENDENCE WITH WITHDRAWAL DELIRIUM   SNOMED Code(s): 3291084


   Comment: 


- CAS ORTEGA'azar


- Pleasantly confused today   





(3) Foot pain, left


Current Visit: Yes   Status: Acute   Code(s): M79.672 - PAIN IN LEFT FOOT   

SNOMED Code(s): 41803730


   Comment: 


- c/o left foot pain to touch, swelling and redness noted, left great toe with 

swelling - improved redness today 


- Suspect this is gout


stop colchicine - will start prednisone daily 


   





(4) Subdural hematoma


Current Visit: Yes   Status: Acute   Code(s): S06.5X9A - TRAUM SUBDR HEM W LOC 

OF UNSP DURATION, INIT   SNOMED Code(s): 798528295


   Comment: 


- Head CT repeated , no changes noted and mentation appears stable


- Appreciate Neurosurgery consult, no intervention required at this time


- Continue neuro checks   





(5) COPD (chronic obstructive pulmonary disease)


Current Visit: No   Status: Chronic   Code(s): J44.9 - CHRONIC OBSTRUCTIVE 

PULMONARY DISEASE, UNSPECIFIED   SNOMED Code(s): 50748168


   Comment: 


- No signs of exacerbation


- Continue Dulera, Spiriva, and Albuterol PRN   





(6) Laceration


Current Visit: Yes   Status: Acute   Code(s): VBU3481 -    SNOMED Code(s): 

707579945


   Comment: 


- Sutures removed on 3/3 from forehead without issues 


- Left elbow sutures due for removal 10-14 days after placement, - steri strips 

removed and dry dresssing placed , no sutures in place 


   





(7) DVT prophylaxis


Current Visit: No   Status: Acute   Code(s): LLB1750 -    SNOMED Code(s): 

490898110


   Comment: 


 - SCDs only in setting of SDH   





(8) Full code status


Current Visit: Yes   Status: Acute   Code(s): Z78.9 - OTHER SPECIFIED HEALTH 

STATUS   SNOMED Code(s): 270862419


   





(9) KYLAH (acute kidney injury)


Current Visit: Yes   Status: Acute   Code(s): N17.9 - ACUTE KIDNEY FAILURE, 

UNSPECIFIED   SNOMED Code(s): 04134444


   Comment: bun/creatinine up yesterday


- suspect this is related to dehydration as patient has had poor po inake - 

patient was given IVF bolus and fluids overnight - BUN and creatinine improving 


- will repeat BMP in the AM    


Status and Disposition: 





Inpatient, status is improving. Awaiting bed offer for [facility].

## 2019-03-08 VITALS — DIASTOLIC BLOOD PRESSURE: 66 MMHG | SYSTOLIC BLOOD PRESSURE: 127 MMHG

## 2019-03-08 LAB
ANION GAP SERPL CALC-SCNC: 7 MMOL/L (ref 2–11)
BUN SERPL-MCNC: 26 MG/DL (ref 6–24)
BUN/CREAT SERPL: 23.6 (ref 8–20)
CALCIUM SERPL-MCNC: 8.1 MG/DL (ref 8.6–10.3)
CHLORIDE SERPL-SCNC: 111 MMOL/L (ref 101–111)
GLUCOSE SERPL-MCNC: 148 MG/DL (ref 70–100)
HCO3 SERPL-SCNC: 24 MMOL/L (ref 22–32)
POTASSIUM SERPL-SCNC: 4 MMOL/L (ref 3.5–5)
SODIUM SERPL-SCNC: 142 MMOL/L (ref 135–145)

## 2019-03-08 RX ADMIN — HEPARIN SODIUM SCH UNITS: 5000 INJECTION INTRAVENOUS; SUBCUTANEOUS at 05:38

## 2019-03-08 RX ADMIN — LORAZEPAM PRN MG: 1 TABLET ORAL at 03:16

## 2019-03-08 RX ADMIN — FERROUS SULFATE TAB 325 MG (65 MG ELEMENTAL FE) SCH MG: 325 (65 FE) TAB at 08:21

## 2019-03-08 RX ADMIN — PREDNISONE SCH MG: 10 TABLET ORAL at 08:21

## 2019-03-08 RX ADMIN — TIOTROPIUM BROMIDE SCH CAP: 18 CAPSULE ORAL; RESPIRATORY (INHALATION) at 07:32

## 2019-03-08 RX ADMIN — MOMETASONE FUROATE AND FORMOTEROL FUMARATE DIHYDRATE SCH PUFF: 200; 5 AEROSOL RESPIRATORY (INHALATION) at 07:32

## 2019-03-08 RX ADMIN — THERA TABS SCH TAB: TAB at 08:21

## 2019-03-08 RX ADMIN — PANTOPRAZOLE SODIUM SCH MG: 40 TABLET, DELAYED RELEASE ORAL at 08:21

## 2019-03-08 RX ADMIN — FOLIC ACID SCH MG: 1 TABLET ORAL at 08:21

## 2019-03-08 RX ADMIN — Medication SCH MG: at 08:21

## 2019-03-08 NOTE — DS
CC:  [facility]; Dr. Rosenthal.*

 

DISCHARGE SUMMARY:

 

DATE OF ADMISSION:  02/25/19

 

DATE OF DISCHARGE:  03/08/19

 

ATTENDING PHYSICIAN:  Dr. Minna Fajardo * (dictated by Liat Hartmann NP).

 

PRIMARY CARE PROVIDER:  Dr. Philip Rosenthal.

 

PRIMARY DIAGNOSES:

1. Altered mental status related to dementia and alcohol withdrawal.

2.  Stable subdural hematoma.

3.  Upper respiratory tract infection.

4.  Gout.

 

SECONDARY DIAGNOSES:

1.  Chronic obstructive pulmonary disease,

2.  Alcohol abuse.

3.  Chronic kidney disease stage 3.

4.  Hypertension.

 

STUDIES COMPLETED WHILE IN THE HOSPITAL:  He had a CT of the brain on 02/24/19, 
radiologist's impression:  Small acute on chronic right frontoparietal subdural 
hematoma, age-related atrophy and mild chronic small vessel ischemic disease.  
Had a chest x-ray on 02/24/19, radiologist's impression:  Basilar fibrotic 
changes stable.  He had maxillofacial CT 02/24/19, right supraorbital contusion
, laceration.  No acute fractures.  Elbow x-ray of the left elbow:  Osteopenia, 
osteoarthritis.  No acute osseous injury.  He had elbow x-ray on the right: 
Osteopenia, osteoarthritis,  No acute osseous injury,  He had a hand x-ray on 02
/25/19 of the right hand:  Osteopenia, osteoarthritis.  Findings suggest of no 
acute osseous injury.  He had a CT of the brain on 02/25/19.  Stable subacute 
right frontoparietal subdural hematoma.  There is no shift.  He had a repeat CT 
of the brain on 02/27/19.  Stable right frontoparietal subdural hematoma.  He 
had a knee x- ray on 02/28/19.  Chronic chondrocalcinosis without evidence of 
fracture.  He had a left ankle x-ray, degenerative and postsurgical changes of 
the left ankle without radiographic apparent fracture or dislocation.  He had a 
repeat CT of the brain on 03/07/19,  radiologist's impression;  No traumatic 
intracranial abnormalities, stable,  Chronic right subdural hematoma.  Age-
related atrophy, mild chronic small vessel ischemic disease.  He had an 
electrocardiogram on 02/27/19, which showed sinus tachycardia at a rate of 100 
with PACs.

 

DISCHARGE MEDICATIONS:

1.  Allegra 60 mg p.o. daily.

2.  Ferrous sulfate 325 mg p.o. daily.

3.  Albuterol nebulizer 2.5 mg q.2 hours as needed for shortness of breath.

4.  Multivitamin 1 tablet p.o. daily.

5.  Spiriva 1 cap inhaled p.o. daily.

6.  Thiamine 100 mg p.o. daily.

7.  Senna 1 tablet p.o. at bedtime.

8.  Protonix 40 mg p.o. b.i.d.

9.  Milk of magnesia 30 mg p.o. q.4 hours as needed.

10.  Lorazepam 1 mg p.o. q.6 hours as needed for agitation.

11.  Folic acid 1 mg p.o. daily.

12.  Docusate 100 mg p.o. b.i.d. p.r.n.

13.  Acetaminophen 650 mg p.o. q.4 hours as needed,

14.  Prednisone 30 mg p.o. daily x3 days, then 20 mg p.o. daily x3 days, and 
then 10 mg p.o. daily x3 days.

 

HISTORY OF PRESENT ILLNESS:  Mr. Payne is an 89-year-old male with a past 
medial history significant for alcohol use disorder, currently drinks 12 beers 
and whisky daily; history of dementia and chronic encephalopathy with recurrent 
falls; chronic kidney disease stage 3; hypertension; COPD, who presents to the 
emergency room after mechanical fall with a blood alcohol  level of 200.  His 
family members reported that he tripped and fell on the concrete and tried to 
break the fall with his hands hitting both of his elbows and hands and then 
hitting his right rl. There was no reports of loss of consciousness 
during the fall.  The patient was able to be ambulatory after the fall.  
Because of the bleeding, his family brought him to the emergency room as they 
thought he may require sutures.

 

While in the emergency room, the patient had routine lab work drawn.  He was 
only oriented to person.  There was no gross focal deficits.  They did put 
sutures in his right forehead which have subsequently been removed.  He had 
multiple radiographic imaging which were all negative for fractures.  His CT of 
the head did show a right parietal subdural hematoma which has been stable 
throughout this hospitalization with the most recent repeat CT of his head on 03
/07/19.

 

While in the hospital, he was monitored and had normal checks.  He does have 
baseline dementia with some confusion.  He is unaware where he is.  He is 
oriented to person only.  The patient did have a fall out of bed on 03/07/19.  
He did have a repeat CT of the head, which continued to show stable subdural 
hematoma and no changes.  No acute injury.  The patient also has gout in his 
left foot which he was treated with colchicine and now is currently being 
treated with prednisone tapering, which has improved his symptoms and pain in 
the left foot.  He also does have gout in the right second toe.

 

The patient does continue to have some confusion at baseline.  He is only 
oriented to person.  He has no other complaints.  He is a 2-assist with 
pivoting and getting out of bed.

 

At this time, Mr. Payne is stable for discharge to [facility].

 

Vital Signs: Blood pressure 120/60, temperature was 98.2, heart rate was 77, 
respirations 18, O2 saturation 96% on room air.

 

REVIEW OF SYSTEMS:  The patient denies any acute pain.  Denies any chest pain 
or shortness of breath.  He reports that the pain in his left foot has 
improved.  He currently denies any pain in the right second toe.  He denies any 
nausea, vomiting or diarrhea though he does have dementia at baseline and is 
only oriented to self. Unable to obtain the rest of the review of systems.

 

PHYSICAL EXAMINATION:  General:  At this time, Mr. Payne is sitting in the 
chair. He is oriented to person only.  His speech is clear.  He does not 
appeared to be in any acute distress.  HEENT:  He does have a healing scab to 
his right forehead. Eyes:  Pupils are equal and reactive to light.  EOMS are 
intact.  Sclerae anicteric and  not pale.  Oral mucosa is moist.  Neck is 
supple.  Lungs are clear to clear to bilaterally.  No wheezes, rales or 
rhonchi.  Cardiac:  S1, S2.  Regular rate and rhythm.  No murmurs, rubs or 
gallops.  Abdomen:  Soft and nontender.  Bowel sounds are present x4.  He is 
able to move all 4 extremities.  He does have healing scabbed areas to both 
elbows and ecchymosis to his left knee.  His left foot continues to have slight 
redness noted to the medial aspect of the left foot and the base of the left 
great toe.  He does also has some redness noted to the second toe on the right 
foot.  Skin:  Sutures were removed to the laceration to his forehead and Steri-
Strips were removed from the laceration to his left elbow.  Dry dressing was 
applied to both elbows.  Neurologic:  He is alert.  He is oriented to person 
only.  He is not in any acute distress.  He will follow some commands.

 

DISCHARGE PLAN:  Mr. Payne will be discharged to [facility].

 

Activity:  PT/OT.  He is a 2-assist, with a walker.

 

He could have a regular diet.

 

1.  Altered mental status.  I suspect this is related to alcohol withdrawal 
which he is out of acute withdrawal.  At this time, he has no tremors and no 
agitation, but continues to have confusion.  I also suspect this is related to 
his underlying dementia.  I would recommend continuing with thiamine, 
multivitamin and folic acid daily.  He can have Lorazepam 1 mg p.o. q.6 hours 
as needed for severe agitation.

2.  Subdural hematoma.  The patient does have a chronic stable subdural 
hematoma. I would continue with supportive care and monitoring.  He can have 
Tylenol as needed for any head pain.

3.  Gout.  The patient was treated with colchicine during his hospitalization.  
He has been transitioned to prednisone.  He will continue prednisone 30 mg p.o. 
daily x3 days, then 20 mg p.o. daily x3 days, 10 mg p.o. daily x3 days.

4.  COPD.  The patient should continue on Spiriva and Dulera and albuterol meds 
as needed for shortness of breath.

5.  Hypertension.  The patient has been normotensive throughout his 
hospitalization and mild hypotension.  I would continue to his 
hydrochlorothiazide and just continue to monitor his blood pressure.

6.  GERD.  He should continue on Protonix 40 mg p.o. daily.

7.  Code status:  He is a full code.

 

The patient should follow up with his primary care provider in 1 to 2 weeks as 
needed.

 

He should return to the emergency room for any significant alteration in 
mentation, facial drooping, weakness on one side, chest pain, or shortness of 
breath or any other concerning symptoms.

 

This is a summarization of his hospitalization.  If further details are needed, 
please see the entire medial record.

 

TIME SPENT:  Time spent on this discharge is 60 minutes, greater than half that 
time was spent implementing discharge plans and instructions.

 

CONDITION ON DISCHARGE:  Stable.

 

DISPOSITION:  Disposition on discharge is [facility].

 

I have discussed this with my attending, Dr. Minna Fajardo; she is in 
agreement with my plan

 

____________________________________ LIAT HARTMANN, NP

 

[id number]/CPS #: [id number]

[facility]

## 2019-07-09 ENCOUNTER — HOSPITAL ENCOUNTER (EMERGENCY)
Dept: HOSPITAL 25 - ED | Age: 84
Discharge: HOME | End: 2019-07-09
Payer: MEDICARE

## 2019-07-09 VITALS — SYSTOLIC BLOOD PRESSURE: 168 MMHG | DIASTOLIC BLOOD PRESSURE: 89 MMHG

## 2019-07-09 DIAGNOSIS — J44.9: ICD-10-CM

## 2019-07-09 DIAGNOSIS — I10: ICD-10-CM

## 2019-07-09 DIAGNOSIS — Z79.899: ICD-10-CM

## 2019-07-09 DIAGNOSIS — Z87.891: ICD-10-CM

## 2019-07-09 DIAGNOSIS — Z88.0: ICD-10-CM

## 2019-07-09 DIAGNOSIS — J18.9: Primary | ICD-10-CM

## 2019-07-09 LAB
ALBUMIN SERPL BCG-MCNC: 3.9 G/DL (ref 3.2–5.2)
ALBUMIN/GLOB SERPL: 1.1 {RATIO} (ref 1–3)
ALP SERPL-CCNC: 85 U/L (ref 34–104)
ALT SERPL W P-5'-P-CCNC: 12 U/L (ref 7–52)
ANION GAP SERPL CALC-SCNC: 6 MMOL/L (ref 2–11)
APTT PPP: 92.9 SECONDS (ref 26–38)
AST SERPL-CCNC: 11 U/L (ref 13–39)
BASOPHILS # BLD AUTO: 0 10^3/UL (ref 0–0.2)
BUN SERPL-MCNC: 18 MG/DL (ref 6–24)
BUN/CREAT SERPL: 20.5 (ref 8–20)
CALCIUM SERPL-MCNC: 9.7 MG/DL (ref 8.6–10.3)
CHLORIDE SERPL-SCNC: 105 MMOL/L (ref 101–111)
EOSINOPHIL # BLD AUTO: 0.2 10^3/UL (ref 0–0.6)
GLOBULIN SER CALC-MCNC: 3.6 G/DL (ref 2–4)
GLUCOSE SERPL-MCNC: 105 MG/DL (ref 70–100)
HCO3 SERPL-SCNC: 26 MMOL/L (ref 22–32)
HCT VFR BLD AUTO: 37 % (ref 42–52)
HGB BLD-MCNC: 11.9 G/DL (ref 14–18)
INR PPP/BLD: 1.01 (ref 0.82–1.09)
LYMPHOCYTES # BLD AUTO: 1 10^3/UL (ref 1–4.8)
MCH RBC QN AUTO: 27 PG (ref 27–31)
MCHC RBC AUTO-ENTMCNC: 32 G/DL (ref 31–36)
MCV RBC AUTO: 83 FL (ref 80–94)
MONOCYTES # BLD AUTO: 0.5 10^3/UL (ref 0–0.8)
NEUTROPHILS # BLD AUTO: 3.8 10^3/UL (ref 1.5–7.7)
NRBC # BLD AUTO: 0 10^3/UL
NRBC BLD QL AUTO: 0
PLATELET # BLD AUTO: 294 10^3/UL (ref 150–450)
POTASSIUM SERPL-SCNC: 3.9 MMOL/L (ref 3.5–5)
PROT SERPL-MCNC: 7.5 G/DL (ref 6.4–8.9)
RBC # BLD AUTO: 4.46 10^6 /UL (ref 4.18–5.48)
SODIUM SERPL-SCNC: 137 MMOL/L (ref 135–145)
TROPONIN I SERPL-MCNC: 0 NG/ML (ref ?–0.04)
WBC # BLD AUTO: 5.4 10^3/UL (ref 3.5–10.8)

## 2019-07-09 PROCEDURE — 93005 ELECTROCARDIOGRAM TRACING: CPT

## 2019-07-09 PROCEDURE — 84484 ASSAY OF TROPONIN QUANT: CPT

## 2019-07-09 PROCEDURE — 99283 EMERGENCY DEPT VISIT LOW MDM: CPT

## 2019-07-09 PROCEDURE — 96361 HYDRATE IV INFUSION ADD-ON: CPT

## 2019-07-09 PROCEDURE — 85730 THROMBOPLASTIN TIME PARTIAL: CPT

## 2019-07-09 PROCEDURE — 85025 COMPLETE CBC W/AUTO DIFF WBC: CPT

## 2019-07-09 PROCEDURE — 85610 PROTHROMBIN TIME: CPT

## 2019-07-09 PROCEDURE — 80053 COMPREHEN METABOLIC PANEL: CPT

## 2019-07-09 PROCEDURE — 87040 BLOOD CULTURE FOR BACTERIA: CPT

## 2019-07-09 PROCEDURE — 83605 ASSAY OF LACTIC ACID: CPT

## 2019-07-09 PROCEDURE — 36415 COLL VENOUS BLD VENIPUNCTURE: CPT

## 2019-07-09 PROCEDURE — 96374 THER/PROPH/DIAG INJ IV PUSH: CPT

## 2019-07-09 PROCEDURE — 71045 X-RAY EXAM CHEST 1 VIEW: CPT

## 2019-07-09 NOTE — ED
Respiratory





- HPI Summary


HPI Summary: 


This patient is a 89 year old M presenting to [facility] accompanied by wife and 

daughter with a chief complaint of hemoptysis since 5 days ago. The patient 

rates the pain 0/10 in severity, per triage. Symptoms aggravated by nothing. 

Symptoms alleviated by nothing. Patient denies decreased appetite, SOB, weakness

, chest pain. Per wife, patient had pneumonia in April 2019. Wife states that 

patient was given antibiotics which relieved his symptoms. Per wife, patient 

had a CT scan done in May 2019 at [facility] which was negative. Wife states that 

patient has been coughing up dark brown matter for a couple of weeks. Per wife, 

Dr. Rosenthal, primary care provider at [facility], prescribed patient antibiotics 

for 5 days which provided relief. 5 days after the antibiotics, patient reports 

hemoptysis per wife. PMHx emphysema for which he uses an inhaler. Patient 

denies outdoor work in gardens or dirt.





- History of Current Complaint


Chief Complaint: EDUpperRespComplaint


Stated Complaint: COUGHING UP BROWN MATTER/BLOOD X 2 WEEKS, PER WIFE


Time Seen by Provider: 07/09/19 19:42


Hx Obtained From: Patient, Family/Caretaker - Wife


Onset/Duration: Lasting Days - 5, Still Present


Timing: Constant


Pain Intensity: 0


Character: Wheezing, Cough (Productive) - Dark brown matter at first and 

currently red blood


Sputum Color: Gray, Brown, Red (Blood)


Aggravating Factor(s): Nothing


Alleviating Factor(s): Nothing





- Allergy/Home Medications


Allergies/Adverse Reactions: 


 Allergies











Allergy/AdvReac Type Severity Reaction Status Date / Time


 


Penicillins Allergy  Hives Verified 07/09/19 16:33











Home Medications: 


 Home Medications





Omeprazole 20 mg PO DAILY 07/09/19 [History Confirmed 07/09/19]











PMH/Surg Hx/FS Hx/Imm Hx


Previously Healthy: No


Endocrine/Hematology History: 


   Denies: Hx Diabetes


Cardiovascular History: Reports: Hx Hypertension


Respiratory History: Reports: Hx Chronic Bronchitis, Hx Chronic Obstructive 

Pulmonary Disease (COPD), Hx Pneumonia


   Denies: Hx Asthma


 History: 


   Denies: Hx Dialysis


Sensory History: Reports: Hx Deafness


   Denies: Hx Contacts or Glasses, Hx Hearing Aid


Opthamlomology History: 


   Denies: Hx Contacts or Glasses


Neurological History: Reports: Hx Dementia


   Denies: Hx Seizures


Psychiatric History: 


   Denies: Hx Autism





- Cancer History


Cancer Type, Location and Year: Denies Hx CA





- Surgical History


Surgery Procedure, Year, and Place: bilateral ankles - years ago


Infectious Disease History: No


Infectious Disease History: 


   Denies: Traveled Outside the [place] in Last 30 Days





- Family History


Known Family History: Positive: Other - Spinal tumor





- Social History


Alcohol Use: Daily


Alcohol Amount: 12+ beers and whiskey daily


Hx Substance Use: No


Substance Use Type: Reports: None


Hx Tobacco Use: Yes


Smoking Status (MU): Former Smoker





Review of Systems


Negative: Chest Pain


Positive: Other - hemoptysis.  Negative: Shortness Of Breath


Gastrointestinal: Negative - Decreased apeptite


Negative: Weakness


All Other Systems Reviewed And Are Negative: Yes





Physical Exam





- Summary


Physical Exam Summary: 


Appearance: Elderly male lying in bed comfortably, no acute distress, vital 

signs are unremarkable


Skin: Warm, dry, no obvious rash


Eyes: sclera anicteric, no conjunctival pallor


ENT: mucous membranes moist, pharynx appears normal


Neck: Supple, nontender


Respiratory: Signs of consolidation in the right chest, bronchial breath sounds 

and egophony in right chest, Scattered wheezing bilaterally


Cardiovascular: Normal S1, S2. No murmurs. Normal distal pulses in tibial and 

radial bilaterally.


Abdomen: Soft, nontender, normal active bowel sounds present


Musculoskeletal: Normal, Strength/ROM Intact


Neurological: A&Ox3, awake and alert, mentation is normal, speech is fluent and 

appropriate


Psychiatric: affect is normal, does not appear anxious or depressed


Triage Information Reviewed: Yes


Vital Signs On Initial Exam: 


 Initial Vitals











Temp Pulse Resp BP Pulse Ox


 


 98.5 F   71   16   155/90   96 


 


 07/09/19 16:29  07/09/19 16:29  07/09/19 16:29  07/09/19 16:29  07/09/19 16:29











Vital Signs Reviewed: Yes





Diagnostics





- Vital Signs


 Vital Signs











  Temp Pulse Resp BP Pulse Ox


 


 07/09/19 18:36  98.2 F  61  16  150/81  94


 


 07/09/19 16:29  98.5 F  71  16  155/90  96














- Laboratory


Result Diagrams: 


 07/09/19 20:15





 07/09/19 20:15


Lab Statement: Any lab studies that have been ordered have been reviewed, and 

results considered in the medical decision making process.





- Radiology


  ** CXR


Radiology Interpretation Completed By: ED Physician


Summary of Radiographic Findings: Improving left-sided infiltrate. No acute 

changes. Pending offical report.





- EKG


  ** 1944


Cardiac Rate: NL - 70 BPM


EKG Rhythm: Sinus Rhythm


Summary of EKG Findings: NSR at 70 BPM, P waves, QRS complex, and T waves are 

within normal limits, T waves and intervals are normal, no ischemic changes. 

This is a normal EKG.





Disposition





- Course


Course Of Treatment: This patient is a 89 year old M presenting to [facility] 

accompanied by wife and daughter with a chief complaint of hemoptysis since 5 

days ago. Patient denies decreased appetite, SOB, weakness, chest pain. Per wife

, patient had pneumonia in April 2019. PMHx emphysema for which he uses an 

inhaler. Physical exam reveals elderly male lying in bed comfortably, no acute 

distress, vital signs are unremarkable. Lung exam reveals signs of 

consolidation in the right chest, bronchial breath sounds and egophony in right 

chest, and scattered wheezing bilaterally. Test results with no significant 

abnormalities except for Hgb 11.9, Hct 37, RDW 18, MPV 6.6, INR 1.01, APTT 92.9

, BUN/Creatinine Ratio 20.5, Glucose 105, and AST 11. In the ED course, the 

patient was given methylpredinsolone sodium succinate 125 mg and Albuterol 2.5 

mg. Chest X-ray reveals improving left-sided infiltrate and no acute changes. 

EKG reveals NSR at 70 BPM, P waves, QRS complex, and T waves are within normal 

limits, T waves and intervals are normal, no ischemic changes. This is a normal 

EKG. We discussed patient care with radha Soliman, who agrees to admit 

patient. Upon evaluation by hospitalist, hospitalist thinks patient does not 

think patient needs to be admitted. Hospitalist asks that patient gets 

discharged. We discussed plan with patient. Patient is agreeable with this 

plan. Patient will be discharged.





- Diagnoses


Provider Diagnoses: 


 Pneumonia








- Physician Notifications


Discussed Care Of Patient With: Delta Garcia


Time Discussed With Above Provider: 22:07


Instructed by Provider To: Other - ahmet Solimanist, agrees to admit 

patient





Discharge





- Sign-Out/Discharge


Documenting (check all that apply): Patient Departure - Discharge


Patient Received Moderate/Deep Sedation with Procedure: No





- Discharge Plan


Condition: Good


Disposition: HOME


Prescriptions: 


ceFUROXime TAB(*) [Ceftin  MG(*)] 500 mg PO BID #20 tab


DOXYcycline CAP(*) [DOXYcycline 100MG CAP(*)] 100 mg PO BID #20 cap


Patient Education Materials:  Pneumonia (ED)


Referrals: 


Philip Rosenthal MD [Primary Care Provider] - 3 Days (if not improving)





- Billing Disposition and Condition


Condition: GOOD


Disposition: Home





- Attestation Statements


Document Initiated by Scribe: Yes


Documenting Scribe: yLndsey Durham


Provider For Whom Taras is Documenting (Include Credential): Arturo Shin MD


Scribe Attestation: 


Saundra GARCIA Alison Kim, scribed for Arturo Shin MD on 07/10/19 at 0612. 


Scribe Documentation Reviewed: Yes


Provider Attestation: 


The documentation as recorded by the Saundra lai Alison Kim accurately 

reflects the service I personally performed and the decisions made by Arturo canchola MD


Status of Scribe Document: Viewed

## 2019-07-10 NOTE — PN
Progress Note





- Progress Note


Date of Service: 07/09/19


Note: 


Report from Dr. Bowers was called to me at 7:45a to make aware of possible 

underlying mass


This report was faxed to Dr. Calvo (primary) at 8am by Ciro maria clerk for 

follow up


It appears this patient is aware of lung mass (from report dated 2012)